# Patient Record
Sex: FEMALE | Race: AMERICAN INDIAN OR ALASKA NATIVE | NOT HISPANIC OR LATINO | Employment: UNEMPLOYED | URBAN - METROPOLITAN AREA
[De-identification: names, ages, dates, MRNs, and addresses within clinical notes are randomized per-mention and may not be internally consistent; named-entity substitution may affect disease eponyms.]

---
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
## 2020-05-25 NOTE — PLAN OF CARE
"Spoke with pt this morning- she states she woke up \"feeling like shit\" because the provider that met with her \"acted like she knew everything about me and what I've been through. She made it seem like I'm not in all of this pain and like I'm a drug seeker.\" Pt states she talked with the provider about going home today. Says she feels like she is safe to go home and would feel more comfortable there then in the hospital- denies SI.     NP states she spoke with pt's mom and she will be picking pt up this afternoon and will call the unit with an ETA- updated pt and pt's nurse.     " HC talked to Pt about wellness program being available again; he would love to continue but has moved to [place].  HC terminated his contract in [place].

## 2020-06-10 ENCOUNTER — PATIENT OUTREACH (OUTPATIENT)
Dept: CARE COORDINATION | Facility: CLINIC | Age: 20
End: 2020-06-10

## 2020-06-10 ENCOUNTER — PRENATAL OFFICE VISIT (OUTPATIENT)
Dept: OBGYN | Facility: OTHER | Age: 20
End: 2020-06-10
Attending: OBSTETRICS & GYNECOLOGY
Payer: COMMERCIAL

## 2020-06-10 VITALS
SYSTOLIC BLOOD PRESSURE: 122 MMHG | HEIGHT: 68 IN | WEIGHT: 225 LBS | BODY MASS INDEX: 34.1 KG/M2 | HEART RATE: 84 BPM | DIASTOLIC BLOOD PRESSURE: 64 MMHG

## 2020-06-10 DIAGNOSIS — Z34.03 ENCOUNTER FOR SUPERVISION OF NORMAL FIRST PREGNANCY IN THIRD TRIMESTER: Primary | ICD-10-CM

## 2020-06-10 PROCEDURE — 90715 TDAP VACCINE 7 YRS/> IM: CPT

## 2020-06-10 PROCEDURE — 99207 ZZC OB VISIT-NO CHARGE - GICH ONLY: CPT | Performed by: OBSTETRICS & GYNECOLOGY

## 2020-06-10 PROCEDURE — 90471 IMMUNIZATION ADMIN: CPT

## 2020-06-10 RX ORDER — PRENATAL VIT/IRON FUM/FOLIC AC 27MG-0.8MG
1 TABLET ORAL DAILY
COMMUNITY
End: 2024-01-17

## 2020-06-10 ASSESSMENT — MIFFLIN-ST. JEOR: SCORE: 1836.15

## 2020-06-10 ASSESSMENT — PAIN SCALES - GENERAL: PAINLEVEL: NO PAIN (0)

## 2020-06-10 NOTE — LETTER
[facility]  [address]  [place] [address]  [phone]          Martha 10, 2020    RE:  Suzy Miles                                                                                                                                                       [address] [address] [facility]   [facility] [address]            To whom it may concern:    Suzy Miles is under my professional care for her pregnancy. Please allow her to have her chihuahua rat terrier dog for emotional and mental health support.       Sincerely,        Eva Ospina MD

## 2020-06-10 NOTE — PROGRESS NOTES
Clinic Care Coordination Contact    Writer met with patient on this date, patient recently transitioned to [facility] for care due to a positive reputation.   Patient residing in [place], looking for housing, meeting with a landlord tomorrow to discuss renting a trailer [place] a [place] court [place] [address] [facility].   Patient excited about this opportunity.   Patient in positive spirits.   Currently working at [employer] in [place], however, due to COVID19 not open at this time. Patient potentially taking up to a year off to raise her baby.   Patient has emotional support animal.   Dr. REFUGIO Ospina wrote order to support animal re to anxiety and depression.   Patient and FOB remain in relationship and living together.   Patient excited to meet her baby girl.  Writer will plan to meet with patient during next OB appt.   Writer will remain involved and supportive to patient as able.     HARI Webb on 6/10/2020 at 3:55 PM

## 2020-06-10 NOTE — NURSING NOTE
Chief Complaint   Patient presents with     Prenatal Care     Transfer of Care from [facility]        Medication Reconciliation: completed   Haven Arreguin LPN  6/10/2020 2:06 PM

## 2020-06-10 NOTE — PROGRESS NOTES
"Transfer OB Visit    S: Patient is here for transfer of care from [facility]. She reports this pregnancy has been uncomplicated. She has occasional BH ctx, no VB or LOF. +FM.     Past Medical History:   Diagnosis Date     Anxiety      Depression      Ovarian cyst      Past Surgical History:   Procedure Laterality Date     NO HISTORY OF SURGERY       Family History   Problem Relation Age of Onset     Ovarian cysts Mother      Diabetes Maternal Grandmother      Social History     Tobacco Use     Smoking status: Former Smoker     Packs/day: 0.25     Last attempt to quit: 11/15/2019     Years since quittin.5     Smokeless tobacco: Never Used   Substance Use Topics     Alcohol use: Not Currently     Drug use: Never     Lives with her boyfriend in a hotel. They are hoping to move into a trailer in [place] this month. Works at a [employer] but is unemployed because of COVID.     O: /64 (BP Location: Right arm, Patient Position: Sitting, Cuff Size: Adult Large)   Pulse 84   Ht 1.715 m (5' 7.5\")   Wt 102.1 kg (225 lb)   Breastfeeding No   BMI 34.72 kg/m    Gen: Well-appearing, NAD  See OB Flowsheet    A/P:  Suzy Miles is a 19 year old  at 34w0d by 24w6d US, here for return OB visit.  Anemia: on iron  Limited prenatal care  Teen pregnancy: SW consult  Plans no epidural, breastfeeding, condoms vs IUD    PNC: - Prenatal labs reviewed in Care Everywhere and from [facility], O positive, Rubella non-immune, GCT 94  Genetics: late for care  Imaging: dating US at 13w1d, anatomy survey at 24w6d- normal except incomplete heart views, normal on follow up  Immunizations: Tdap 6/10/2020   RTC 2 weeks- CBC next visit    Eva Ospina MD  OB/GYN  6/10/2020 2:08 PM     "

## 2020-06-12 ENCOUNTER — PATIENT OUTREACH (OUTPATIENT)
Dept: CARE COORDINATION | Facility: CLINIC | Age: 20
End: 2020-06-12

## 2020-06-12 NOTE — PROGRESS NOTES
Clinic Care Coordination Contact    Writer placed f/u call to patient on this date as patient was looking for housing and has potential option this week.   Patient due with baby in July and looking for permanent housing.   Writer offered to assist patient as able with options.   VM left for patient requesting a return call.     HARI Webb on 6/12/2020 at 10:34 AM

## 2020-06-24 ENCOUNTER — PATIENT OUTREACH (OUTPATIENT)
Dept: CARE COORDINATION | Facility: CLINIC | Age: 20
End: 2020-06-24

## 2020-06-24 ENCOUNTER — PRENATAL OFFICE VISIT (OUTPATIENT)
Dept: OBGYN | Facility: OTHER | Age: 20
End: 2020-06-24
Attending: OBSTETRICS & GYNECOLOGY
Payer: COMMERCIAL

## 2020-06-24 VITALS
DIASTOLIC BLOOD PRESSURE: 72 MMHG | BODY MASS INDEX: 35.65 KG/M2 | SYSTOLIC BLOOD PRESSURE: 118 MMHG | HEART RATE: 88 BPM | WEIGHT: 231 LBS

## 2020-06-24 DIAGNOSIS — Z34.03 ENCOUNTER FOR SUPERVISION OF NORMAL FIRST PREGNANCY IN THIRD TRIMESTER: Primary | ICD-10-CM

## 2020-06-24 DIAGNOSIS — F41.9 ANXIETY: ICD-10-CM

## 2020-06-24 LAB
ERYTHROCYTE [DISTWIDTH] IN BLOOD BY AUTOMATED COUNT: 13.6 % (ref 10–15)
HCT VFR BLD AUTO: 31.3 % (ref 35–47)
HGB BLD-MCNC: 10.4 G/DL (ref 11.7–15.7)
MCH RBC QN AUTO: 30 PG (ref 26.5–33)
MCHC RBC AUTO-ENTMCNC: 33.2 G/DL (ref 31.5–36.5)
MCV RBC AUTO: 90 FL (ref 78–100)
PLATELET # BLD AUTO: 257 10E9/L (ref 150–450)
RBC # BLD AUTO: 3.47 10E12/L (ref 3.8–5.2)
WBC # BLD AUTO: 7.5 10E9/L (ref 4–11)

## 2020-06-24 PROCEDURE — 85027 COMPLETE CBC AUTOMATED: CPT | Mod: ZL | Performed by: OBSTETRICS & GYNECOLOGY

## 2020-06-24 PROCEDURE — 36415 COLL VENOUS BLD VENIPUNCTURE: CPT | Mod: ZL | Performed by: OBSTETRICS & GYNECOLOGY

## 2020-06-24 PROCEDURE — 87081 CULTURE SCREEN ONLY: CPT | Mod: ZL | Performed by: OBSTETRICS & GYNECOLOGY

## 2020-06-24 PROCEDURE — 99207 ZZC OB VISIT-NO CHARGE - GICH ONLY: CPT | Performed by: OBSTETRICS & GYNECOLOGY

## 2020-06-24 RX ORDER — CITALOPRAM HYDROBROMIDE 20 MG/1
20 TABLET ORAL DAILY
Qty: 90 TABLET | Refills: 0 | Status: ON HOLD | OUTPATIENT
Start: 2020-06-24 | End: 2020-08-01

## 2020-06-24 ASSESSMENT — PAIN SCALES - GENERAL: PAINLEVEL: NO PAIN (0)

## 2020-06-24 NOTE — NURSING NOTE
Chief Complaint   Patient presents with     Prenatal Care     36w0d     Offers no complaints except some pressure and pain   Haven Arreguin LPN........................6/24/2020  12:57 PM     Medication Reconciliation: completed   Haven Arreguin LPN  6/24/2020 12:56 PM
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
## 2020-06-24 NOTE — PROGRESS NOTES
Return OB Visit    S: Patient is feeling well. Some ctx at the end of the day. No VB or LOF. +FM. Just moved into her house.     O: /72 (BP Location: Right arm, Patient Position: Sitting, Cuff Size: Adult Large)   Pulse 88   Wt 104.8 kg (231 lb)   Breastfeeding No   BMI 35.65 kg/m    Gen: Well-appearing, NAD  See OB Flowsheet    A/P:  Suzy Miles is a 19 year old  at 36w0d by 24w6d US, here for return OB visit.  Anemia: on iron  Limited prenatal care  Teen pregnancy: SW consult  Plans no epidural, breastfeeding, condoms vs IUD     PNC: - Prenatal labs reviewed in Care Everywhere and from [facility], O positive, Rubella non-immune, GCT 94  Genetics: late for care  Imaging: dating US at 13w1d, anatomy survey at 24w6d- normal except incomplete heart views, normal on follow up  Immunizations: Tdap 6/10/2020   RTC weekly until delivery    Eva Ospina MD  OB/GYN  2020 12:58 PM

## 2020-06-25 ENCOUNTER — PATIENT OUTREACH (OUTPATIENT)
Dept: CARE COORDINATION | Facility: CLINIC | Age: 20
End: 2020-06-25

## 2020-06-25 NOTE — PROGRESS NOTES
Clinic Care Coordination Contact    Writer attempted 2X call out to patient on this date to offer support.   VM left requesting return call.     HARI Webb on 6/25/2020 at 2:45 PM    Clinic Care Coordination Contact    Writer attempted to reach out to patient on this date to f/u and offer support to patient after our conversation yesterday> Phone went to vm, message left requesting return call.   Writer will attempt to reach back out to patient later in the day if I do not hear back.   Writer will remain involved and supportive to patient as able.     HARI Webb on 6/25/2020 at 8:53 AM

## 2020-06-26 LAB
BACTERIA SPEC CULT: NORMAL
SPECIMEN SOURCE: NORMAL

## 2020-07-01 ENCOUNTER — PRENATAL OFFICE VISIT (OUTPATIENT)
Dept: OBGYN | Facility: OTHER | Age: 20
End: 2020-07-01
Attending: OBSTETRICS & GYNECOLOGY
Payer: COMMERCIAL

## 2020-07-01 ENCOUNTER — PATIENT OUTREACH (OUTPATIENT)
Dept: CARE COORDINATION | Facility: CLINIC | Age: 20
End: 2020-07-01

## 2020-07-01 VITALS
SYSTOLIC BLOOD PRESSURE: 136 MMHG | BODY MASS INDEX: 35.8 KG/M2 | DIASTOLIC BLOOD PRESSURE: 64 MMHG | WEIGHT: 232 LBS | HEART RATE: 96 BPM

## 2020-07-01 DIAGNOSIS — Z34.03 ENCOUNTER FOR SUPERVISION OF NORMAL FIRST PREGNANCY IN THIRD TRIMESTER: Primary | ICD-10-CM

## 2020-07-01 PROCEDURE — 99207 ZZC OB VISIT-NO CHARGE - GICH ONLY: CPT | Performed by: OBSTETRICS & GYNECOLOGY

## 2020-07-01 ASSESSMENT — PAIN SCALES - GENERAL: PAINLEVEL: NO PAIN (0)

## 2020-07-01 NOTE — NURSING NOTE
Chief Complaint   Patient presents with     Prenatal Care     37w0d     Patient has complaints of numbness and pain in her hands.  She also has pelvic pain and pressure.   Haven Arreguin LPN........................7/1/2020  1:04 PM     Medication Reconciliation: completed   Haven Arreguin LPN  7/1/2020 1:04 PM
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
## 2020-07-01 NOTE — PROGRESS NOTES
Return OB Visit    S: Patient is feeling well. Just getting uncomfortable. Some BH ctx. No VB or LOF. +FM    O: /64 (BP Location: Right arm, Patient Position: Sitting, Cuff Size: Adult Large)   Pulse 96   Wt 105.2 kg (232 lb)   Breastfeeding No   BMI 35.80 kg/m    Gen: Well-appearing, NAD  See OB Flowsheet    A/P:  Suzy Miles is a 19 year old  at 37w0d by 13w1d US, here for return OB visit.  Anemia: on iron  Limited prenatal care  Teen pregnancy: SW consult  Plans no epidural, breastfeeding, condoms vs IUD     PNC: - Prenatal labs reviewed in Care Everywhere and from [facility], O positive, Rubella non-immune, GCT 94, GBS negative  Genetics: late for care  Imaging: dating US at 13w1d, anatomy survey at 24w6d- normal except incomplete heart views, normal on follow up  Immunizations: Tdap 6/10/2020   RTC weekly until delivery    Eva Ospina MD  OB/GYN  2020 1:07 PM

## 2020-07-09 ENCOUNTER — PRENATAL OFFICE VISIT (OUTPATIENT)
Dept: OBGYN | Facility: OTHER | Age: 20
End: 2020-07-09
Attending: OBSTETRICS & GYNECOLOGY
Payer: COMMERCIAL

## 2020-07-09 VITALS
WEIGHT: 236.4 LBS | SYSTOLIC BLOOD PRESSURE: 122 MMHG | DIASTOLIC BLOOD PRESSURE: 76 MMHG | BODY MASS INDEX: 36.48 KG/M2 | HEART RATE: 98 BPM

## 2020-07-09 DIAGNOSIS — O12.03 EDEMA DURING PREGNANCY IN THIRD TRIMESTER: Primary | ICD-10-CM

## 2020-07-09 LAB
ALBUMIN MFR UR ELPH: 16 MG/DL (ref 1–14)
CREAT UR-MCNC: 97 MG/DL
PROT/CREAT 24H UR: 0.16 MG/G{CREAT}

## 2020-07-09 PROCEDURE — 84156 ASSAY OF PROTEIN URINE: CPT | Mod: ZL | Performed by: OBSTETRICS & GYNECOLOGY

## 2020-07-09 PROCEDURE — 99207 ZZC OB VISIT-NO CHARGE - GICH ONLY: CPT | Performed by: OBSTETRICS & GYNECOLOGY

## 2020-07-09 ASSESSMENT — PAIN SCALES - GENERAL: PAINLEVEL: NO PAIN (0)

## 2020-07-09 NOTE — PROGRESS NOTES
CC: Recheck OB visit at 38w1d    HPI: Suzy Miles presents for a routine OB visit now at 38w1d  She has no concerns. Denies cramping, bleeding, normal fetal movement    OB History    Para Term  AB Living   1 0 0 0 0 0   SAB TAB Ectopic Multiple Live Births   0 0 0 0 0      # Outcome Date GA Lbr Juaquin/2nd Weight Sex Delivery Anes PTL Lv   1 Current              Current Outpatient Medications   Medication     B Complex Vitamins (VITAMIN-B COMPLEX PO)     cholecalciferol (VITAMIN D3) 125 MCG (5000 UT) TABS tablet     citalopram (CELEXA) 20 MG tablet     Ferrous Gluconate (IRON 27 PO)     Prenatal Vit-Fe Fumarate-FA (PRENATAL MULTIVITAMIN W/IRON) 27-0.8 MG tablet     No current facility-administered medications for this visit.          O: /76 (BP Location: Right arm)   Pulse 98   Wt 107.2 kg (236 lb 6.4 oz)   BMI 36.48 kg/m    Body mass index is 36.48 kg/m .  See OB flow sheet  EXAM:  NAD  EFW 7 lbs  FHT:145 bpm    CX FT-1, 25, -2 cephalic    No results found for any visits on 20.    A/P: 38w1d gestation    Recheck in 1 week    Teen pregnancy  Late prenatal care    Reji Carrion MD FACOG  1:02 PM 2020

## 2020-07-15 ENCOUNTER — PRENATAL OFFICE VISIT (OUTPATIENT)
Dept: OBGYN | Facility: OTHER | Age: 20
End: 2020-07-15
Attending: OBSTETRICS & GYNECOLOGY
Payer: COMMERCIAL

## 2020-07-15 ENCOUNTER — PATIENT OUTREACH (OUTPATIENT)
Dept: CARE COORDINATION | Facility: CLINIC | Age: 20
End: 2020-07-15

## 2020-07-15 VITALS
BODY MASS INDEX: 35.95 KG/M2 | DIASTOLIC BLOOD PRESSURE: 60 MMHG | SYSTOLIC BLOOD PRESSURE: 124 MMHG | HEART RATE: 100 BPM | WEIGHT: 233 LBS

## 2020-07-15 DIAGNOSIS — Z34.03 ENCOUNTER FOR SUPERVISION OF NORMAL FIRST PREGNANCY IN THIRD TRIMESTER: Primary | ICD-10-CM

## 2020-07-15 PROCEDURE — 99207 ZZC OB VISIT-NO CHARGE - GICH ONLY: CPT | Performed by: OBSTETRICS & GYNECOLOGY

## 2020-07-15 ASSESSMENT — PAIN SCALES - GENERAL: PAINLEVEL: NO PAIN (0)

## 2020-07-15 NOTE — PROGRESS NOTES
Clinic Care Coordination Contact    Writer met with patient on this date after OB appointment.  Patient currently 39 weeks, dilated to 1, states she is ready to be done with this pregnancy due to discomfort.  Patient aware of the longer the baby stays in the better, but she is uncomfortable and ready to meet her girl.  Patient has appropriate sleeping arrangements for baby, car seat, and plans to meet with [facility] after OB appointment to transition to [facility] where she currently resides.    Writer was able to to her patient to [facility] on this date to see birthing room and meet some the nurses.    Writer will remain involved in supportive to patient as able.    HARI Webb on 7/15/2020 at 1:52 PM

## 2020-07-15 NOTE — PROGRESS NOTES
Return OB Visit    S: Patient is feeling well, just uncomfortable. Some ctx. No VB or LOF. +FM    O: /60 (BP Location: Right arm, Patient Position: Sitting, Cuff Size: Adult Large)   Pulse 100   Wt 105.7 kg (233 lb)   Breastfeeding No   BMI 35.95 kg/m    Gen: Well-appearing, NAD  See OB Flowsheet    A/P:  Suzy Miles is a 19 year old  at 39w0d by 13w1d US, here for return OB visit.  Anemia: on iron  Limited prenatal care  Teen pregnancy: SW consult  Plans no epidural, breastfeeding, condoms vs IUD     PNC: - Prenatal labs reviewed in Care Everywhere and from [facility], O positive, Rubella non-immune, GCT 94, GBS negative  Genetics: late for care  Imaging: dating US at 13w1d, anatomy survey at 24w6d- normal except incomplete heart views, normal on follow up  Immunizations: Tdap 6/10/2020   RTC weekly until delivery    Eva Ospina MD  OB/GYN  7/15/2020 1:09 PM

## 2020-07-15 NOTE — NURSING NOTE
Chief Complaint   Patient presents with     Prenatal Care     39w0d     Increased fatigue and RLQ pelvic pain   Otherwise offers no complaints  Haven Arreguin LPN........................7/15/2020  1:05 PM   Medication Reconciliation: completed   Haven Arreguin LPN  7/15/2020 1:04 PM

## 2020-07-22 ENCOUNTER — PATIENT OUTREACH (OUTPATIENT)
Dept: CARE COORDINATION | Facility: CLINIC | Age: 20
End: 2020-07-22

## 2020-07-22 ENCOUNTER — PRENATAL OFFICE VISIT (OUTPATIENT)
Dept: OBGYN | Facility: OTHER | Age: 20
End: 2020-07-22
Attending: OBSTETRICS & GYNECOLOGY
Payer: COMMERCIAL

## 2020-07-22 VITALS
OXYGEN SATURATION: 98 % | BODY MASS INDEX: 36.32 KG/M2 | DIASTOLIC BLOOD PRESSURE: 64 MMHG | HEART RATE: 94 BPM | SYSTOLIC BLOOD PRESSURE: 116 MMHG | WEIGHT: 235.4 LBS | RESPIRATION RATE: 16 BRPM

## 2020-07-22 DIAGNOSIS — Z34.03 ENCOUNTER FOR SUPERVISION OF NORMAL FIRST PREGNANCY IN THIRD TRIMESTER: Primary | ICD-10-CM

## 2020-07-22 PROCEDURE — 59425 ANTEPARTUM CARE ONLY: CPT | Performed by: OBSTETRICS & GYNECOLOGY

## 2020-07-22 PROCEDURE — 99207 ZZC OB VISIT-NO CHARGE - GICH ONLY: CPT | Performed by: OBSTETRICS & GYNECOLOGY

## 2020-07-22 RX ORDER — LIDOCAINE 40 MG/G
CREAM TOPICAL
Status: CANCELLED | OUTPATIENT
Start: 2020-07-22

## 2020-07-22 RX ORDER — OXYCODONE AND ACETAMINOPHEN 5; 325 MG/1; MG/1
1 TABLET ORAL
Status: CANCELLED | OUTPATIENT
Start: 2020-07-22

## 2020-07-22 RX ORDER — ACETAMINOPHEN 325 MG/1
650 TABLET ORAL EVERY 4 HOURS PRN
Status: CANCELLED | OUTPATIENT
Start: 2020-07-22

## 2020-07-22 RX ORDER — SODIUM CHLORIDE, SODIUM LACTATE, POTASSIUM CHLORIDE, CALCIUM CHLORIDE 600; 310; 30; 20 MG/100ML; MG/100ML; MG/100ML; MG/100ML
INJECTION, SOLUTION INTRAVENOUS CONTINUOUS
Status: CANCELLED | OUTPATIENT
Start: 2020-07-22

## 2020-07-22 RX ORDER — ONDANSETRON 2 MG/ML
4 INJECTION INTRAMUSCULAR; INTRAVENOUS EVERY 6 HOURS PRN
Status: CANCELLED | OUTPATIENT
Start: 2020-07-22

## 2020-07-22 RX ORDER — NALOXONE HYDROCHLORIDE 0.4 MG/ML
.1-.4 INJECTION, SOLUTION INTRAMUSCULAR; INTRAVENOUS; SUBCUTANEOUS
Status: CANCELLED | OUTPATIENT
Start: 2020-07-22

## 2020-07-22 RX ORDER — MISOPROSTOL 100 UG/1
25 TABLET ORAL EVERY 4 HOURS PRN
Status: CANCELLED | OUTPATIENT
Start: 2020-07-22

## 2020-07-22 RX ORDER — TERBUTALINE SULFATE 1 MG/ML
0.25 INJECTION, SOLUTION SUBCUTANEOUS
Status: CANCELLED | OUTPATIENT
Start: 2020-07-22

## 2020-07-22 RX ORDER — METHYLERGONOVINE MALEATE 0.2 MG/ML
200 INJECTION INTRAVENOUS
Status: CANCELLED | OUTPATIENT
Start: 2020-07-22

## 2020-07-22 RX ORDER — IBUPROFEN 200 MG
800 TABLET ORAL
Status: CANCELLED | OUTPATIENT
Start: 2020-07-22

## 2020-07-22 RX ORDER — OXYTOCIN 10 [USP'U]/ML
10 INJECTION, SOLUTION INTRAMUSCULAR; INTRAVENOUS
Status: CANCELLED | OUTPATIENT
Start: 2020-07-22

## 2020-07-22 RX ORDER — CARBOPROST TROMETHAMINE 250 UG/ML
250 INJECTION, SOLUTION INTRAMUSCULAR
Status: CANCELLED | OUTPATIENT
Start: 2020-07-22

## 2020-07-22 ASSESSMENT — PAIN SCALES - GENERAL: PAINLEVEL: NO PAIN (0)

## 2020-07-22 NOTE — PROGRESS NOTES
Return OB Visit    S: Patient is ready to be done. No ctx, VB or LOF. +FM, especially at night.    O: /64 (BP Location: Right arm, Patient Position: Sitting, Cuff Size: Adult Large)   Pulse 94   Resp 16   Wt 106.8 kg (235 lb 6.4 oz)   SpO2 98%   Breastfeeding No   BMI 36.32 kg/m    Gen: Well-appearing, NAD  See OB Flowsheet    A/P:  Suzy Miles is a 19 year old  at 40w0d by 13w1d US, here for return OB visit.  Anemia: on iron  Limited prenatal care  Teen pregnancy: SW consult  Plans no epidural, breastfeeding, condoms vs IUD     PNC: - Prenatal labs reviewed in Care Everywhere and from [facility], O positive, Rubella non-immune, GCT 94, GBS negative  Genetics: late for care  Imaging: dating US at 13w1d, anatomy survey at 24w6d- normal except incomplete heart views, normal on follow up  Immunizations: Tdap 6/10/2020   IOL scheduled for  PM for ripening    Eva Ospina MD  OB/GYN  2020 1:02 PM

## 2020-07-22 NOTE — NURSING NOTE
"Chief Complaint   Patient presents with     Prenatal Care     40 w        Initial /64 (BP Location: Right arm, Patient Position: Sitting, Cuff Size: Adult Large)   Pulse 94   Resp 16   Wt 106.8 kg (235 lb 6.4 oz)   SpO2 98%   Breastfeeding No   BMI 36.32 kg/m   Estimated body mass index is 36.32 kg/m  as calculated from the following:    Height as of 6/10/20: 1.715 m (5' 7.5\").    Weight as of this encounter: 106.8 kg (235 lb 6.4 oz).  Medication Reconciliation: complete    Aurelia Benavides LPN  "

## 2020-07-22 NOTE — PROGRESS NOTES
Clinic Care Coordination Contact      Writer met with patient on this date after OB appt.   Patient overall doing well. Ready to be done. Scheduled induction 07/28/2020 if baby does not come sooner.     Patient has bags packed, car seat in car, gas tank full, basinet set in her room and she has FOB ready for baby's arrival.   Patient plans to seek PCP and health care in [place] after baby is born.    Patient in good spirits, very tired. Takes lots of naps, baby is very low patient states, patient needs to urinate frequently due to position of baby.    Patient talks with writer and excitement to meet her baby girl.  Talks about how she believes her baby's ultrasound picture looks just like FOB.    Writer will remain involved in supportive to patient as able.    Plans to establish care in [place] after birth of baby.      HARI Webb on 7/22/2020 at 1:40 PM

## 2020-07-24 ENCOUNTER — NURSE TRIAGE (OUTPATIENT)
Dept: OBGYN | Facility: OTHER | Age: 20
End: 2020-07-24

## 2020-07-24 ENCOUNTER — HOSPITAL ENCOUNTER (OUTPATIENT)
Facility: OTHER | Age: 20
Discharge: HOME | End: 2020-07-24
Attending: OBSTETRICS & GYNECOLOGY | Admitting: OBSTETRICS & GYNECOLOGY
Payer: COMMERCIAL

## 2020-07-24 VITALS
RESPIRATION RATE: 16 BRPM | TEMPERATURE: 97.4 F | SYSTOLIC BLOOD PRESSURE: 125 MMHG | OXYGEN SATURATION: 100 % | DIASTOLIC BLOOD PRESSURE: 78 MMHG

## 2020-07-24 PROBLEM — Z36.89 ENCOUNTER FOR TRIAGE IN PREGNANT PATIENT: Status: ACTIVE | Noted: 2020-07-24

## 2020-07-24 LAB — A1 MICROGLOB PLACENTAL VAG QL: NEGATIVE

## 2020-07-24 PROCEDURE — 84112 EVAL AMNIOTIC FLUID PROTEIN: CPT | Performed by: OBSTETRICS & GYNECOLOGY

## 2020-07-24 PROCEDURE — G0463 HOSPITAL OUTPT CLINIC VISIT: HCPCS

## 2020-07-24 NOTE — PROGRESS NOTES
Patient is a 19 year old  who presents to [facility] with complaints of rupture of membranes and decreased fetal movement. To room 404. Patient reports feeling a gush of fluid on Wednesday following her OB clinic appointment. Denies any further leaking of fluid. Patient reports that she has felt fetal movement today, but less so than usual. EUM/EFM applied. Category I tracing. Patient marking fetal movement. Uterine irritability noted. Amnisure collected and sent to lab. Cervix 1.5cm dilated upon SVE. Will notify MD of patient arrival and status.

## 2020-07-24 NOTE — TELEPHONE ENCOUNTER
Spoke with patient and she states yesterday she had a gush of fluid and it felt like she was incontinent of urine.  But when she smelt it it did not smell like urine.  She states today baby has been much less active.  Per verbal order from Dr. Eva Ospina MD patient should head into [facility].  Patient is in [place] and will leave now to head in to [facility].   Haven Arreguin LPN........................7/24/2020  3:25 PM

## 2020-07-25 NOTE — PROGRESS NOTES
Discharge Note      Data:  Suzy Miles discharged to home at 1919 via ambulation. Accompanied by significant other and staff.    Action:  Written discharge/follow-up instructions were provided to patient and spouse. Prescriptions - None ordered for discharge. All belongings sent with patient.    Response:  Suzy verbalized understanding of discharge instructions, reason for discharge, and necessary follow-up appointments.    Santiago Cedeño RN 07/24/20 7:33 PM

## 2020-07-25 NOTE — PROGRESS NOTES
MD notified of patient arrival and status. Amnisure negative. Reactive FHT. MD orders to discharge patient to home.

## 2020-07-25 NOTE — DISCHARGE INSTRUCTIONS
Discharge Instructions:     Negative amnisure, cervix unchanged, and category one tracing with irritability of uterus reported to Dr. Becca Ospina.     Diet:  * Drink 8 to 12 glasses of liquids (milk, juice, water) every day  * You may eat meals and snacks.    Activity:  * Count fetal kicks every day.  * Call your doctor if your baby is moving less than usual.    -Helpful hints for kick counts:  Lie down in a quiet room on either side. Place your hand on your abdomen palpating for movement. Babies are usually more active after meals. Babies sleep on an average 20 minutes at a time. At times you may have to wait until your baby wakes up on his own. It is important for your baby to move daily.        Call your provider if you notice:  * Swelling in your face or increased swelling in your hands or legs.  * Headaches that are not relieved by Tylenol (acetaminophen).  * Changes in your vision (blurring; seeing spots or stars).  * Nausea (sick to your stomach) and vomiting (throwing up).  * Weight gain of 5 pounds per week.  * Heartburn that doesn't go away.  * Signs of bladder infection: Pain when you urinate (use the toilet), needing to go more often or more urgently.  * The bag of zurita (membrane) breaks, or you notice leaking in your underwear.  * Bright red blood in your underwear.  * Abdominal (lower belly) or stomach pain.     * For first baby: Contractions (tightenings) less than 5 minutes apart for one hour or more.  * Second (plus) baby: Contractions (tightenings) less than 10 minutes apart and getting stronger.  * Increase or change in vaginal discharge (note the color and amount).    [facility]: [phone]  Please keep your scheduled upcoming clinic appointment    SCHEDULED RIPENING/INDUCTION 7/28 at 1700.

## 2020-07-28 ENCOUNTER — HOSPITAL ENCOUNTER (INPATIENT)
Facility: OTHER | Age: 20
LOS: 4 days | Discharge: HOME | End: 2020-08-01
Attending: OBSTETRICS & GYNECOLOGY | Admitting: OBSTETRICS & GYNECOLOGY
Payer: COMMERCIAL

## 2020-07-28 DIAGNOSIS — Z34.03 ENCOUNTER FOR SUPERVISION OF NORMAL FIRST PREGNANCY IN THIRD TRIMESTER: ICD-10-CM

## 2020-07-28 DIAGNOSIS — Z34.90 ENCOUNTER FOR INDUCTION OF LABOR: Primary | ICD-10-CM

## 2020-07-28 DIAGNOSIS — Z98.891 S/P CESAREAN SECTION: ICD-10-CM

## 2020-07-28 LAB
ABO + RH BLD: NORMAL
ABO + RH BLD: NORMAL
BASOPHILS # BLD AUTO: 0 10E9/L (ref 0–0.2)
BASOPHILS NFR BLD AUTO: 0.3 %
BLD GP AB SCN SERPL QL: NORMAL
BLOOD BANK CMNT PATIENT-IMP: NORMAL
DIFFERENTIAL METHOD BLD: ABNORMAL
EOSINOPHIL # BLD AUTO: 0.1 10E9/L (ref 0–0.7)
EOSINOPHIL NFR BLD AUTO: 0.7 %
ERYTHROCYTE [DISTWIDTH] IN BLOOD BY AUTOMATED COUNT: 14.1 % (ref 10–15)
HCT VFR BLD AUTO: 32.6 % (ref 35–47)
HGB BLD-MCNC: 10.8 G/DL (ref 11.7–15.7)
IMM GRANULOCYTES # BLD: 0.1 10E9/L (ref 0–0.4)
IMM GRANULOCYTES NFR BLD: 1.1 %
LYMPHOCYTES # BLD AUTO: 1.5 10E9/L (ref 0.8–5.3)
LYMPHOCYTES NFR BLD AUTO: 19.5 %
MCH RBC QN AUTO: 29.6 PG (ref 26.5–33)
MCHC RBC AUTO-ENTMCNC: 33.1 G/DL (ref 31.5–36.5)
MCV RBC AUTO: 89 FL (ref 78–100)
MONOCYTES # BLD AUTO: 0.5 10E9/L (ref 0–1.3)
MONOCYTES NFR BLD AUTO: 6.6 %
NEUTROPHILS # BLD AUTO: 5.4 10E9/L (ref 1.6–8.3)
NEUTROPHILS NFR BLD AUTO: 71.8 %
PLATELET # BLD AUTO: 255 10E9/L (ref 150–450)
RBC # BLD AUTO: 3.65 10E12/L (ref 3.8–5.2)
SPECIMEN EXP DATE BLD: NORMAL
WBC # BLD AUTO: 7.5 10E9/L (ref 4–11)

## 2020-07-28 PROCEDURE — 86780 TREPONEMA PALLIDUM: CPT | Performed by: OBSTETRICS & GYNECOLOGY

## 2020-07-28 PROCEDURE — 25000128 H RX IP 250 OP 636: Performed by: OBSTETRICS & GYNECOLOGY

## 2020-07-28 PROCEDURE — 86850 RBC ANTIBODY SCREEN: CPT | Performed by: OBSTETRICS & GYNECOLOGY

## 2020-07-28 PROCEDURE — 85025 COMPLETE CBC W/AUTO DIFF WBC: CPT | Performed by: OBSTETRICS & GYNECOLOGY

## 2020-07-28 PROCEDURE — 86901 BLOOD TYPING SEROLOGIC RH(D): CPT | Performed by: OBSTETRICS & GYNECOLOGY

## 2020-07-28 PROCEDURE — 86900 BLOOD TYPING SEROLOGIC ABO: CPT | Performed by: OBSTETRICS & GYNECOLOGY

## 2020-07-28 PROCEDURE — 3E0P7VZ INTRODUCTION OF HORMONE INTO FEMALE REPRODUCTIVE, VIA NATURAL OR ARTIFICIAL OPENING: ICD-10-PCS | Performed by: OBSTETRICS & GYNECOLOGY

## 2020-07-28 PROCEDURE — 12000000 ZZH R&B MED SURG/OB

## 2020-07-28 PROCEDURE — G0463 HOSPITAL OUTPT CLINIC VISIT: HCPCS

## 2020-07-28 PROCEDURE — 36415 COLL VENOUS BLD VENIPUNCTURE: CPT | Performed by: OBSTETRICS & GYNECOLOGY

## 2020-07-28 PROCEDURE — 25000132 ZZH RX MED GY IP 250 OP 250 PS 637: Performed by: OBSTETRICS & GYNECOLOGY

## 2020-07-28 RX ORDER — CARBOPROST TROMETHAMINE 250 UG/ML
250 INJECTION, SOLUTION INTRAMUSCULAR
Status: DISCONTINUED | OUTPATIENT
Start: 2020-07-28 | End: 2020-07-30

## 2020-07-28 RX ORDER — METHYLERGONOVINE MALEATE 0.2 MG/ML
200 INJECTION INTRAVENOUS
Status: COMPLETED | OUTPATIENT
Start: 2020-07-28 | End: 2020-07-30

## 2020-07-28 RX ORDER — OXYCODONE AND ACETAMINOPHEN 5; 325 MG/1; MG/1
1 TABLET ORAL
Status: DISCONTINUED | OUTPATIENT
Start: 2020-07-28 | End: 2020-07-30

## 2020-07-28 RX ORDER — MISOPROSTOL 100 UG/1
25 TABLET ORAL EVERY 4 HOURS PRN
Status: DISCONTINUED | OUTPATIENT
Start: 2020-07-28 | End: 2020-07-30

## 2020-07-28 RX ORDER — HYDROXYZINE PAMOATE 25 MG/1
100 CAPSULE ORAL
Status: DISCONTINUED | OUTPATIENT
Start: 2020-07-28 | End: 2020-07-30

## 2020-07-28 RX ORDER — NALOXONE HYDROCHLORIDE 0.4 MG/ML
.1-.4 INJECTION, SOLUTION INTRAMUSCULAR; INTRAVENOUS; SUBCUTANEOUS
Status: DISCONTINUED | OUTPATIENT
Start: 2020-07-28 | End: 2020-07-30

## 2020-07-28 RX ORDER — SODIUM CHLORIDE, SODIUM LACTATE, POTASSIUM CHLORIDE, CALCIUM CHLORIDE 600; 310; 30; 20 MG/100ML; MG/100ML; MG/100ML; MG/100ML
INJECTION, SOLUTION INTRAVENOUS CONTINUOUS
Status: DISCONTINUED | OUTPATIENT
Start: 2020-07-28 | End: 2020-07-30

## 2020-07-28 RX ORDER — ACETAMINOPHEN 325 MG/1
650 TABLET ORAL EVERY 4 HOURS PRN
Status: DISCONTINUED | OUTPATIENT
Start: 2020-07-28 | End: 2020-07-30

## 2020-07-28 RX ORDER — LIDOCAINE 40 MG/G
CREAM TOPICAL
Status: DISCONTINUED | OUTPATIENT
Start: 2020-07-28 | End: 2020-07-30

## 2020-07-28 RX ORDER — OXYTOCIN 10 [USP'U]/ML
10 INJECTION, SOLUTION INTRAMUSCULAR; INTRAVENOUS
Status: DISCONTINUED | OUTPATIENT
Start: 2020-07-28 | End: 2020-07-30

## 2020-07-28 RX ORDER — IBUPROFEN 400 MG/1
800 TABLET, FILM COATED ORAL
Status: DISCONTINUED | OUTPATIENT
Start: 2020-07-28 | End: 2020-07-30

## 2020-07-28 RX ORDER — ONDANSETRON 2 MG/ML
4 INJECTION INTRAMUSCULAR; INTRAVENOUS EVERY 6 HOURS PRN
Status: DISCONTINUED | OUTPATIENT
Start: 2020-07-28 | End: 2020-07-30

## 2020-07-28 RX ORDER — MORPHINE SULFATE 10 MG/ML
10 INJECTION, SOLUTION INTRAMUSCULAR; INTRAVENOUS
Status: COMPLETED | OUTPATIENT
Start: 2020-07-28 | End: 2020-07-28

## 2020-07-28 RX ORDER — TERBUTALINE SULFATE 1 MG/ML
0.25 INJECTION, SOLUTION SUBCUTANEOUS
Status: DISCONTINUED | OUTPATIENT
Start: 2020-07-28 | End: 2020-07-30

## 2020-07-28 RX ADMIN — MORPHINE SULFATE 10 MG: 10 INJECTION INTRAVENOUS at 22:16

## 2020-07-28 RX ADMIN — MISOPROSTOL 25 MCG: 100 TABLET ORAL at 18:01

## 2020-07-28 RX ADMIN — MISOPROSTOL 25 MCG: 100 TABLET ORAL at 22:16

## 2020-07-28 RX ADMIN — HYDROXYZINE PAMOATE 100 MG: 25 CAPSULE ORAL at 22:16

## 2020-07-28 ASSESSMENT — ACTIVITIES OF DAILY LIVING (ADL)
RETIRED_COMMUNICATION: 0-->UNDERSTANDS/COMMUNICATES WITHOUT DIFFICULTY
AMBULATION: 0-->INDEPENDENT
TOILETING: 0-->INDEPENDENT
COGNITION: 0 - NO COGNITION ISSUES REPORTED
RETIRED_EATING: 0-->INDEPENDENT
DRESS: 0-->INDEPENDENT
SWALLOWING: 0-->SWALLOWS FOODS/LIQUIDS WITHOUT DIFFICULTY
TRANSFERRING: 0-->INDEPENDENT
BATHING: 0-->INDEPENDENT
FALL_HISTORY_WITHIN_LAST_SIX_MONTHS: NO

## 2020-07-28 ASSESSMENT — MIFFLIN-ST. JEOR: SCORE: 1881.51

## 2020-07-28 NOTE — H&P
[facility] Labor and Delivery History and Physical    Suzy Miles MRN# [id number]   Age: 19 year old YOB: 2000     Date of Admission:  2020    Primary care provider: No Ref-Primary, Physician           Chief Complaint:   Suzy Miles is a 19 year old  at 40w6d by 13w1d US admitted for IOL for late-term gestation. Some ctx, no regular pattern. No VB or LOF. +FM          Pregnancy history:     OBSTETRIC HISTORY:    OB History    Para Term  AB Living   1 0 0 0 0 0   SAB TAB Ectopic Multiple Live Births   0 0 0 0 0      # Outcome Date GA Lbr Juaquin/2nd Weight Sex Delivery Anes PTL Lv   1 Current                EDC: Estimated Date of Delivery: 2020    Prenatal Labs:   Lab Results   Component Value Date    HGB 10.4 (L) 2020       GBS Status: negative    Active Problem List  Patient Active Problem List   Diagnosis     Major depression     Encounter for triage in pregnant patient       Medication Prior to Admission  Medications Prior to Admission   Medication Sig Dispense Refill Last Dose     B Complex Vitamins (VITAMIN-B COMPLEX PO) Take 1 tablet by mouth daily        cholecalciferol (VITAMIN D3) 125 MCG (5000 UT) TABS tablet Take by mouth daily        citalopram (CELEXA) 20 MG tablet Take 1 tablet (20 mg) by mouth daily 90 tablet 0      Ferrous Gluconate (IRON 27 PO) Take 1 tablet by mouth daily        Prenatal Vit-Fe Fumarate-FA (PRENATAL MULTIVITAMIN W/IRON) 27-0.8 MG tablet Take 1 tablet by mouth daily      .        Maternal Past Medical History:     Past Medical History:   Diagnosis Date     Anxiety      Depression      Ovarian cyst      Past Surgical History:   Procedure Laterality Date     NO HISTORY OF SURGERY                         Family History:     Family History   Problem Relation Age of Onset     Ovarian cysts Mother      Diabetes Maternal Grandmother      [facility] history [facility]            Social History:      Social History     Tobacco Use     Smoking status: Former Smoker     Packs/day: 0.25     Last attempt to quit: 11/15/2019     Years since quittin.7     Smokeless tobacco: Never Used   Substance Use Topics     Alcohol use: Not Currently            Review of Systems:   The Review of Systems is negative other than noted in the HPI          Physical Exam:   No data found.  Gen: resting in bed, NAD  CV: RRR  Resp: CTAB  Abd: gravid, soft, nontender  Ext: nontender    Cervix: 1.5/50/-3  Membranes: intact  EFW: 7.5-8 lbs  Presentation:Cephalic    Fetal Heart Rate Tracin, mod variability, + accels, no decels  Tocometer: infrequent        Assessment:   Suzy Miles is a 19 year old  at 40w6d admitted for IOL for late-term gestation          Plan:   FWB: Cat I, reactive. EFW 7.5-8 lbs  Labor: will start with cervical ripening with misoprostol  Pain: per patient request  Rh positive, RI, GBS negative  Anticipate     Eva Ospina MD

## 2020-07-28 NOTE — LETTER
[facility]  [address]  [place] [address]  [phone]          2020    RE:  Suzy Miles                                                                                                                                                       [address] [place] [id number]            To whom it may concern:    Suzy Miles is under my professional care for her pregnancy. She delivered via  section on 2020. Please allow her off from work until  to recover from her delivery.       Sincerely,    Eva Ospina MD

## 2020-07-28 NOTE — PROGRESS NOTES
19 year old female admitted to room 406 for scheduled cervical ripening at 40.6 weeks gestation. Oriented to room, answered questions and reviewed plan of care with patient and shelia Aguirre. M applied. RAIMUNDO PISANO here to see patient and sterile vaginal exam completed. Cervix 1.5cm, 50% effaced and vertex fetal positioning.

## 2020-07-28 NOTE — LETTER
[facility]  [address]  [place] [address]  [phone]          2020    RE:  Suzy Miles                                                                                                                                                       [address] [place] [id number]            To whom it may concern:    Suzy Miles delivered via  section on 2020. Please excuse her boyfriend, Timothy, from work for 2 weeks to assist her during her recovery.    Sincerely,      Eva Ospina MD

## 2020-07-29 PROCEDURE — 25000128 H RX IP 250 OP 636: Performed by: OBSTETRICS & GYNECOLOGY

## 2020-07-29 PROCEDURE — 25800030 ZZH RX IP 258 OP 636: Performed by: OBSTETRICS & GYNECOLOGY

## 2020-07-29 PROCEDURE — 10H07YZ INSERTION OF OTHER DEVICE INTO PRODUCTS OF CONCEPTION, VIA NATURAL OR ARTIFICIAL OPENING: ICD-10-PCS | Performed by: OBSTETRICS & GYNECOLOGY

## 2020-07-29 PROCEDURE — 12000000 ZZH R&B MED SURG/OB

## 2020-07-29 PROCEDURE — 25000125 ZZHC RX 250: Performed by: OBSTETRICS & GYNECOLOGY

## 2020-07-29 PROCEDURE — 10907ZC DRAINAGE OF AMNIOTIC FLUID, THERAPEUTIC FROM PRODUCTS OF CONCEPTION, VIA NATURAL OR ARTIFICIAL OPENING: ICD-10-PCS | Performed by: OBSTETRICS & GYNECOLOGY

## 2020-07-29 PROCEDURE — 25000132 ZZH RX MED GY IP 250 OP 250 PS 637: Performed by: OBSTETRICS & GYNECOLOGY

## 2020-07-29 RX ORDER — FENTANYL CITRATE 50 UG/ML
100 INJECTION, SOLUTION INTRAMUSCULAR; INTRAVENOUS
Status: DISCONTINUED | OUTPATIENT
Start: 2020-07-29 | End: 2020-07-30

## 2020-07-29 RX ADMIN — FENTANYL CITRATE 100 MCG: 50 INJECTION, SOLUTION INTRAMUSCULAR; INTRAVENOUS at 12:19

## 2020-07-29 RX ADMIN — SODIUM CHLORIDE, POTASSIUM CHLORIDE, SODIUM LACTATE AND CALCIUM CHLORIDE: 600; 310; 30; 20 INJECTION, SOLUTION INTRAVENOUS at 18:01

## 2020-07-29 RX ADMIN — Medication 2 MILLI-UNITS/MIN: at 06:31

## 2020-07-29 RX ADMIN — MISOPROSTOL 25 MCG: 100 TABLET ORAL at 02:32

## 2020-07-29 RX ADMIN — FENTANYL CITRATE 100 MCG: 50 INJECTION, SOLUTION INTRAMUSCULAR; INTRAVENOUS at 14:20

## 2020-07-29 RX ADMIN — SODIUM CHLORIDE, POTASSIUM CHLORIDE, SODIUM LACTATE AND CALCIUM CHLORIDE 1000 ML: 600; 310; 30; 20 INJECTION, SOLUTION INTRAVENOUS at 14:16

## 2020-07-29 RX ADMIN — SODIUM CHLORIDE, POTASSIUM CHLORIDE, SODIUM LACTATE AND CALCIUM CHLORIDE: 600; 310; 30; 20 INJECTION, SOLUTION INTRAVENOUS at 06:30

## 2020-07-29 RX ADMIN — FENTANYL CITRATE 100 MCG: 50 INJECTION, SOLUTION INTRAMUSCULAR; INTRAVENOUS at 10:55

## 2020-07-29 NOTE — PROGRESS NOTES
"Intrapartum Progress Note    S: Patient is breathing through contractions. Had a gush earlier but no continued leaking.     O: /65   Pulse 70   Temp 97.2  F (36.2  C) (Temporal)   Resp 18   Ht 1.715 m (5' 7.5\")   Wt 106.6 kg (235 lb)   SpO2 100%   Breastfeeding No   BMI 36.26 kg/m     Gen: resting in bed, NAD  Cvx: 2-3/80/-2    FHT: 150, mod variability, + accels, no decels  [place]: 5 contractions in 10 min    Membranes: AROM- clear    A/P: 19 year old  at 41w0d by 13w1d US admitted for IOL for late-term gestation  FWB: Cat I, reactive. EFW 7.5-8 lbs  Labor: s/p misoprostol overnight, on pitocin per protocol  Pain: per patient request  GBS: negative, PCN not indicated  Rh: positive  Rubella: immune  Continue routine labor management.   Anticipate     Eva Ospina MD 10:10 AM    "

## 2020-07-29 NOTE — PROGRESS NOTES
"Intrapartum Progress Note    S: Patient is tired, was cramping most of the night and couldn't sleep. Contractions are uncomfortable.      O: /65   Pulse 94   Temp 97.2  F (36.2  C)   Resp 18   Ht 1.715 m (5' 7.5\")   Wt 106.6 kg (235 lb)   SpO2 100%   Breastfeeding No   BMI 36.26 kg/m     Gen: on birthing ball  Cvx: 1.5 cm at RN exam at 0600    FHT: 140, mod variability, + accels, no decels  [place]: 4-5 contractions in 10 min    Membranes: intact    A/P: 19 year old  at 41w0d by 13w1d US admitted for IOL for late-term gestation  FWB: Cat I, reactive. EFW 7.5-8 lbs  Labor: s/p misoprostol overnight, on pitocin per protocol  Pain: per patient request  GBS: negative, PCN not indicated  Rh: positive  Rubella: immune  Continue routine labor management.   Anticipate     Eva Ospina MD 8:07 AM    "

## 2020-07-29 NOTE — PLAN OF CARE
This writer attempted two IV''s, two more attempts by another RN, new IV placed by HECTOR Henderson.  Restarted fluids and Pitocin at 2.

## 2020-07-29 NOTE — PLAN OF CARE
Stopped Pitocin.  Second 500 mL bolus given.  New bag of LR to hang at 125/hr and IV infiltrated.  Will establish new IV, fluids and pitocin restart at that time.

## 2020-07-29 NOTE — PLAN OF CARE
Labor Shift Note  Data: See Flowsheets activity for contraction and fetal documentation.   Vitals:    20 2358 20 0001 20 0021 20 0724   BP:  129/84 117/71 115/65   Pulse:       Resp:       Temp: 96.6  F (35.9  C)   97.2  F (36.2  C)   TempSrc:       SpO2: 100%   100%   Weight:       Height:       . Signs and symptoms of infection Absent.    Complications in labor: Absent. Support person sleeping on cot at bedside present.  Interventions: Continue uterine/fetal assessment per orders and nursing discretion. Vital Signs per order set. Comfort measures/pain control/labor management: Ambulation, hydration, position changes, birthing ball/sling and tub options to facilitate labor.  Anticipate   Plan: Anticipate . Provide labor/coping assistance as needed by patient and support person.  Observe for and notify care provider of indications of progressing labor, need for pain medications, or signs of fetal/maternal compromise.

## 2020-07-29 NOTE — PLAN OF CARE
Labor Shift Note  Data: See Flowsheets activity for contraction and fetal documentation.   Vitals:    20 0001 20 0021 20 0724 20 1545   BP: 129/84 117/71 115/65 138/80   Pulse:   70 66   Resp:       Temp:   97.2  F (36.2  C) 97.4  F (36.3  C)   TempSrc:   Temporal Temporal   SpO2:   100% 99%   Weight:       Height:       . Signs and symptoms of infection Absent.    Complications in labor: Absent. Support person Timothy present.  Interventions: Continue uterine/fetal assessment per orders and nursing discretion. Vital Signs per order set. Comfort measures/pain control/labor management: Ambulation, hydration, position changes, birthing ball/sling and tub options to facilitate labor.  Anticipate .  Patient has had three doses of IV Fentanyl 100mcg, tolerated well and states that helps her cope with labor pain.  Does not want epidural at this time.    MD on Dr. Ospina will check in on patient before 8:30PM today-consulted and agrees with plan.  Plan: Anticipate . Provide labor/coping assistance as needed by patient and support person.  Observe for and notify care provider of indications of progressing labor, need for pain medications, or signs of fetal/maternal compromise.

## 2020-07-29 NOTE — PROGRESS NOTES
"Intrapartum Progress Note    S: Patient is very uncomfortable.     O: /65   Pulse 70   Temp 97.2  F (36.2  C) (Temporal)   Resp 18   Ht 1.715 m (5' 7.5\")   Wt 106.6 kg (235 lb)   SpO2 100%   Breastfeeding No   BMI 36.26 kg/m     Gen: resting in bed, NAD  Cvx: 3/80/-2    FHT: 140, mod variability, + accels, no decels  [place]: irritability, IUPC placed    Membranes: s/p AROM, clear    A/P: 19 year old  at 41w0d by 13w1d US admitted for IOL for late-term gestation  FWB: Cat I, reactive. EFW 7.5-8 lbs  Labor: s/p misoprostol overnight, on pitocin per protocol, IUPC placed. No progress since last exam- previous exam difficult due to patient discomfort and suspect was not actually 4-5 cm.  Pain: per patient request  GBS: negative, PCN not indicated  Rh: positive  Rubella: immune  Continue routine labor management.   Anticipate     Eva Ospina MD 2:43 PM    "

## 2020-07-29 NOTE — PROGRESS NOTES
Morphine 10mg vial overridden and handed off to Liliya YOUNG.   Head atraumatic, normal cephalic shape.

## 2020-07-29 NOTE — PROGRESS NOTES
"Intrapartum Progress Note    S: Patient is very uncomfortable.     O: /65   Pulse 70   Temp 97.2  F (36.2  C) (Temporal)   Resp 18   Ht 1.715 m (5' 7.5\")   Wt 106.6 kg (235 lb)   SpO2 100%   Breastfeeding No   BMI 36.26 kg/m     Gen: resting in bed, NAD  Cvx: 4-5/80/-2    FHT: 140, mod variability, + accels, no decels  [place]: 5 contractions in 10 min    Membranes: s/p AROM, clear    A/P: 19 year old  at 41w0d by 13w1d US admitted for IOL for late-term gestation  FWB: Cat I, reactive. EFW 7.5-8 lbs  Labor: s/p misoprostol overnight, on pitocin per protocol  Pain: per patient request  GBS: negative, PCN not indicated  Rh: positive  Rubella: immune  Continue routine labor management.   Anticipate     Eva Ospina MD 12:37 PM    "

## 2020-07-29 NOTE — PROGRESS NOTES
Patient has been resting comfortably in between doses of cytotec, given at 0225. SVE at 0225: 1.5cm/60-70%/-2. Patient reports passing her mucous plug when getting up to the bathroom at 0200.

## 2020-07-29 NOTE — PROGRESS NOTES
Patient given second dose of cytotec at  2225. SVE unchanged from admit. Morphine and Vistaril administered at 2216 to assist patient with sleep.

## 2020-07-30 ENCOUNTER — ANESTHESIA EVENT (OUTPATIENT)
Dept: SURGERY | Facility: OTHER | Age: 20
End: 2020-07-30
Payer: COMMERCIAL

## 2020-07-30 ENCOUNTER — ANESTHESIA (OUTPATIENT)
Dept: SURGERY | Facility: OTHER | Age: 20
End: 2020-07-30
Payer: COMMERCIAL

## 2020-07-30 PROBLEM — Z98.891 S/P CESAREAN SECTION: Status: ACTIVE | Noted: 2020-07-30

## 2020-07-30 LAB
LABORATORY COMMENT REPORT: NORMAL
SARS-COV-2 RNA SPEC QL NAA+PROBE: NEGATIVE
SARS-COV-2 RNA SPEC QL NAA+PROBE: NORMAL
SPECIMEN SOURCE: NORMAL
SPECIMEN SOURCE: NORMAL
T PALLIDUM AB SER QL: NONREACTIVE

## 2020-07-30 PROCEDURE — 25000128 H RX IP 250 OP 636: Performed by: NURSE ANESTHETIST, CERTIFIED REGISTERED

## 2020-07-30 PROCEDURE — 59510 CESAREAN DELIVERY: CPT | Performed by: NURSE ANESTHETIST, CERTIFIED REGISTERED

## 2020-07-30 PROCEDURE — 27210794 ZZH OR GENERAL SUPPLY STERILE: Performed by: OBSTETRICS & GYNECOLOGY

## 2020-07-30 PROCEDURE — 25800030 ZZH RX IP 258 OP 636: Performed by: NURSE ANESTHETIST, CERTIFIED REGISTERED

## 2020-07-30 PROCEDURE — 37000008 ZZH ANESTHESIA TECHNICAL FEE, 1ST 30 MIN: Performed by: OBSTETRICS & GYNECOLOGY

## 2020-07-30 PROCEDURE — 25000132 ZZH RX MED GY IP 250 OP 250 PS 637: Performed by: OBSTETRICS & GYNECOLOGY

## 2020-07-30 PROCEDURE — 25000128 H RX IP 250 OP 636: Performed by: OBSTETRICS & GYNECOLOGY

## 2020-07-30 PROCEDURE — 37000009 ZZH ANESTHESIA TECHNICAL FEE, EACH ADDTL 15 MIN: Performed by: OBSTETRICS & GYNECOLOGY

## 2020-07-30 PROCEDURE — 36000056 ZZH SURGERY LEVEL 3 1ST 30 MIN: Performed by: OBSTETRICS & GYNECOLOGY

## 2020-07-30 PROCEDURE — 25800030 ZZH RX IP 258 OP 636: Performed by: OBSTETRICS & GYNECOLOGY

## 2020-07-30 PROCEDURE — 99140 ANES COMP EMERGENCY COND: CPT | Performed by: NURSE ANESTHETIST, CERTIFIED REGISTERED

## 2020-07-30 PROCEDURE — 71000014 ZZH RECOVERY PHASE 1 LEVEL 2 FIRST HR: Performed by: OBSTETRICS & GYNECOLOGY

## 2020-07-30 PROCEDURE — 59515 CESAREAN DELIVERY: CPT | Performed by: OBSTETRICS & GYNECOLOGY

## 2020-07-30 PROCEDURE — 25000125 ZZHC RX 250: Performed by: OBSTETRICS & GYNECOLOGY

## 2020-07-30 PROCEDURE — 36000058 ZZH SURGERY LEVEL 3 EA 15 ADDTL MIN: Performed by: OBSTETRICS & GYNECOLOGY

## 2020-07-30 PROCEDURE — 25000125 ZZHC RX 250: Performed by: NURSE ANESTHETIST, CERTIFIED REGISTERED

## 2020-07-30 PROCEDURE — 87635 SARS-COV-2 COVID-19 AMP PRB: CPT | Performed by: OBSTETRICS & GYNECOLOGY

## 2020-07-30 PROCEDURE — 12000000 ZZH R&B MED SURG/OB

## 2020-07-30 RX ORDER — BUPIVACAINE HYDROCHLORIDE 7.5 MG/ML
INJECTION, SOLUTION INTRASPINAL PRN
Status: DISCONTINUED | OUTPATIENT
Start: 2020-07-30 | End: 2020-07-30

## 2020-07-30 RX ORDER — FENTANYL CITRATE 50 UG/ML
25-50 INJECTION, SOLUTION INTRAMUSCULAR; INTRAVENOUS
Status: DISCONTINUED | OUTPATIENT
Start: 2020-07-30 | End: 2020-07-30 | Stop reason: HOSPADM

## 2020-07-30 RX ORDER — MORPHINE SULFATE 0.5 MG/ML
INJECTION, SOLUTION EPIDURAL; INTRATHECAL; INTRAVENOUS PRN
Status: DISCONTINUED | OUTPATIENT
Start: 2020-07-30 | End: 2020-07-30

## 2020-07-30 RX ORDER — KETOROLAC TROMETHAMINE 30 MG/ML
30 INJECTION, SOLUTION INTRAMUSCULAR; INTRAVENOUS EVERY 6 HOURS
Status: COMPLETED | OUTPATIENT
Start: 2020-07-30 | End: 2020-07-31

## 2020-07-30 RX ORDER — AMOXICILLIN 250 MG
2 CAPSULE ORAL 2 TIMES DAILY
Status: DISCONTINUED | OUTPATIENT
Start: 2020-07-30 | End: 2020-08-01 | Stop reason: HOSPADM

## 2020-07-30 RX ORDER — CEFAZOLIN SODIUM 1 G/50ML
1 INJECTION, SOLUTION INTRAVENOUS SEE ADMIN INSTRUCTIONS
Status: DISCONTINUED | OUTPATIENT
Start: 2020-07-30 | End: 2020-07-30 | Stop reason: HOSPADM

## 2020-07-30 RX ORDER — IBUPROFEN 400 MG/1
800 TABLET, FILM COATED ORAL EVERY 6 HOURS
Status: DISCONTINUED | OUTPATIENT
Start: 2020-07-31 | End: 2020-08-01 | Stop reason: HOSPADM

## 2020-07-30 RX ORDER — OXYCODONE HYDROCHLORIDE 5 MG/1
5-10 TABLET ORAL EVERY 4 HOURS PRN
Status: DISCONTINUED | OUTPATIENT
Start: 2020-07-30 | End: 2020-08-01 | Stop reason: HOSPADM

## 2020-07-30 RX ORDER — HYDROCORTISONE 2.5 %
CREAM (GRAM) TOPICAL 3 TIMES DAILY PRN
Status: DISCONTINUED | OUTPATIENT
Start: 2020-07-30 | End: 2020-08-01 | Stop reason: HOSPADM

## 2020-07-30 RX ORDER — PROPOFOL 10 MG/ML
INJECTION, EMULSION INTRAVENOUS PRN
Status: DISCONTINUED | OUTPATIENT
Start: 2020-07-30 | End: 2020-07-30

## 2020-07-30 RX ORDER — OXYCODONE HYDROCHLORIDE 5 MG/1
5 TABLET ORAL EVERY 4 HOURS PRN
Status: DISCONTINUED | OUTPATIENT
Start: 2020-07-30 | End: 2020-07-30 | Stop reason: DRUGHIGH

## 2020-07-30 RX ORDER — OXYCODONE HYDROCHLORIDE 5 MG/1
10 TABLET ORAL EVERY 4 HOURS PRN
Status: DISCONTINUED | OUTPATIENT
Start: 2020-07-30 | End: 2020-07-30 | Stop reason: DRUGHIGH

## 2020-07-30 RX ORDER — HYDROMORPHONE HYDROCHLORIDE 1 MG/ML
0.5 INJECTION, SOLUTION INTRAMUSCULAR; INTRAVENOUS; SUBCUTANEOUS EVERY 4 HOURS PRN
Status: DISCONTINUED | OUTPATIENT
Start: 2020-07-30 | End: 2020-08-01 | Stop reason: HOSPADM

## 2020-07-30 RX ORDER — HYDROMORPHONE HYDROCHLORIDE 1 MG/ML
.3-.5 INJECTION, SOLUTION INTRAMUSCULAR; INTRAVENOUS; SUBCUTANEOUS EVERY 5 MIN PRN
Status: DISCONTINUED | OUTPATIENT
Start: 2020-07-30 | End: 2020-07-30 | Stop reason: HOSPADM

## 2020-07-30 RX ORDER — ONDANSETRON 2 MG/ML
4 INJECTION INTRAMUSCULAR; INTRAVENOUS EVERY 30 MIN PRN
Status: DISCONTINUED | OUTPATIENT
Start: 2020-07-30 | End: 2020-07-30 | Stop reason: HOSPADM

## 2020-07-30 RX ORDER — NALOXONE HYDROCHLORIDE 0.4 MG/ML
.1-.4 INJECTION, SOLUTION INTRAMUSCULAR; INTRAVENOUS; SUBCUTANEOUS
Status: DISCONTINUED | OUTPATIENT
Start: 2020-07-30 | End: 2020-07-30

## 2020-07-30 RX ORDER — ONDANSETRON 2 MG/ML
INJECTION INTRAMUSCULAR; INTRAVENOUS PRN
Status: DISCONTINUED | OUTPATIENT
Start: 2020-07-30 | End: 2020-07-30

## 2020-07-30 RX ORDER — AMOXICILLIN 250 MG
1 CAPSULE ORAL 2 TIMES DAILY
Status: DISCONTINUED | OUTPATIENT
Start: 2020-07-30 | End: 2020-08-01 | Stop reason: HOSPADM

## 2020-07-30 RX ORDER — CEFAZOLIN SODIUM 2 G/100ML
INJECTION, SOLUTION INTRAVENOUS PRN
Status: DISCONTINUED | OUTPATIENT
Start: 2020-07-30 | End: 2020-07-30

## 2020-07-30 RX ORDER — OXYTOCIN 10 [USP'U]/ML
INJECTION, SOLUTION INTRAMUSCULAR; INTRAVENOUS PRN
Status: DISCONTINUED | OUTPATIENT
Start: 2020-07-30 | End: 2020-07-30

## 2020-07-30 RX ORDER — CITRIC ACID/SODIUM CITRATE 334-500MG
30 SOLUTION, ORAL ORAL
Status: DISCONTINUED | OUTPATIENT
Start: 2020-07-30 | End: 2020-07-30 | Stop reason: HOSPADM

## 2020-07-30 RX ORDER — ONDANSETRON 2 MG/ML
4 INJECTION INTRAMUSCULAR; INTRAVENOUS EVERY 6 HOURS PRN
Status: DISCONTINUED | OUTPATIENT
Start: 2020-07-30 | End: 2020-08-01 | Stop reason: HOSPADM

## 2020-07-30 RX ORDER — OXYTOCIN 10 [USP'U]/ML
10 INJECTION, SOLUTION INTRAMUSCULAR; INTRAVENOUS
Status: DISCONTINUED | OUTPATIENT
Start: 2020-07-30 | End: 2020-08-01 | Stop reason: HOSPADM

## 2020-07-30 RX ORDER — MAGNESIUM HYDROXIDE 1200 MG/15ML
LIQUID ORAL PRN
Status: DISCONTINUED | OUTPATIENT
Start: 2020-07-30 | End: 2020-07-30 | Stop reason: HOSPADM

## 2020-07-30 RX ORDER — SODIUM CHLORIDE, SODIUM LACTATE, POTASSIUM CHLORIDE, CALCIUM CHLORIDE 600; 310; 30; 20 MG/100ML; MG/100ML; MG/100ML; MG/100ML
INJECTION, SOLUTION INTRAVENOUS CONTINUOUS
Status: DISCONTINUED | OUTPATIENT
Start: 2020-07-30 | End: 2020-08-01 | Stop reason: HOSPADM

## 2020-07-30 RX ORDER — LIDOCAINE HYDROCHLORIDE 10 MG/ML
INJECTION, SOLUTION INFILTRATION; PERINEURAL PRN
Status: DISCONTINUED | OUTPATIENT
Start: 2020-07-30 | End: 2020-07-30

## 2020-07-30 RX ORDER — NALOXONE HYDROCHLORIDE 0.4 MG/ML
.1-.4 INJECTION, SOLUTION INTRAMUSCULAR; INTRAVENOUS; SUBCUTANEOUS
Status: ACTIVE | OUTPATIENT
Start: 2020-07-30 | End: 2020-07-31

## 2020-07-30 RX ORDER — SIMETHICONE 80 MG
80 TABLET,CHEWABLE ORAL 4 TIMES DAILY PRN
Status: DISCONTINUED | OUTPATIENT
Start: 2020-07-30 | End: 2020-08-01 | Stop reason: HOSPADM

## 2020-07-30 RX ORDER — DEXTROSE, SODIUM CHLORIDE, SODIUM LACTATE, POTASSIUM CHLORIDE, AND CALCIUM CHLORIDE 5; .6; .31; .03; .02 G/100ML; G/100ML; G/100ML; G/100ML; G/100ML
INJECTION, SOLUTION INTRAVENOUS CONTINUOUS
Status: DISCONTINUED | OUTPATIENT
Start: 2020-07-30 | End: 2020-08-01 | Stop reason: HOSPADM

## 2020-07-30 RX ORDER — SODIUM CHLORIDE, SODIUM LACTATE, POTASSIUM CHLORIDE, CALCIUM CHLORIDE 600; 310; 30; 20 MG/100ML; MG/100ML; MG/100ML; MG/100ML
INJECTION, SOLUTION INTRAVENOUS CONTINUOUS
Status: DISCONTINUED | OUTPATIENT
Start: 2020-07-30 | End: 2020-07-30 | Stop reason: HOSPADM

## 2020-07-30 RX ORDER — BISACODYL 10 MG
10 SUPPOSITORY, RECTAL RECTAL DAILY PRN
Status: DISCONTINUED | OUTPATIENT
Start: 2020-08-01 | End: 2020-08-01 | Stop reason: HOSPADM

## 2020-07-30 RX ORDER — ACETAMINOPHEN 325 MG/1
975 TABLET ORAL EVERY 6 HOURS
Status: DISCONTINUED | OUTPATIENT
Start: 2020-07-30 | End: 2020-08-01 | Stop reason: HOSPADM

## 2020-07-30 RX ORDER — MODIFIED LANOLIN
OINTMENT (GRAM) TOPICAL
Status: DISCONTINUED | OUTPATIENT
Start: 2020-07-30 | End: 2020-08-01 | Stop reason: HOSPADM

## 2020-07-30 RX ORDER — AZITHROMYCIN 500 MG/5ML
500 INJECTION, POWDER, LYOPHILIZED, FOR SOLUTION INTRAVENOUS
Status: DISCONTINUED | OUTPATIENT
Start: 2020-07-30 | End: 2020-07-30 | Stop reason: HOSPADM

## 2020-07-30 RX ORDER — KETOROLAC TROMETHAMINE 30 MG/ML
30 INJECTION, SOLUTION INTRAMUSCULAR; INTRAVENOUS EVERY 6 HOURS
Status: DISCONTINUED | OUTPATIENT
Start: 2020-07-30 | End: 2020-07-30

## 2020-07-30 RX ORDER — CEFAZOLIN SODIUM 2 G/100ML
2 INJECTION, SOLUTION INTRAVENOUS
Status: DISCONTINUED | OUTPATIENT
Start: 2020-07-30 | End: 2020-07-30 | Stop reason: HOSPADM

## 2020-07-30 RX ORDER — LIDOCAINE 40 MG/G
CREAM TOPICAL
Status: DISCONTINUED | OUTPATIENT
Start: 2020-07-30 | End: 2020-08-01 | Stop reason: HOSPADM

## 2020-07-30 RX ORDER — ONDANSETRON 4 MG/1
4 TABLET, ORALLY DISINTEGRATING ORAL EVERY 30 MIN PRN
Status: DISCONTINUED | OUTPATIENT
Start: 2020-07-30 | End: 2020-07-30 | Stop reason: HOSPADM

## 2020-07-30 RX ORDER — HYDROXYZINE PAMOATE 25 MG/1
50 CAPSULE ORAL ONCE
Status: COMPLETED | OUTPATIENT
Start: 2020-07-30 | End: 2020-07-30

## 2020-07-30 RX ADMIN — SODIUM CHLORIDE, POTASSIUM CHLORIDE, SODIUM LACTATE AND CALCIUM CHLORIDE: 600; 310; 30; 20 INJECTION, SOLUTION INTRAVENOUS at 00:57

## 2020-07-30 RX ADMIN — OXYCODONE HYDROCHLORIDE 10 MG: 5 TABLET ORAL at 11:50

## 2020-07-30 RX ADMIN — OXYTOCIN 3 UNITS: 10 INJECTION, SOLUTION INTRAMUSCULAR; INTRAVENOUS at 07:17

## 2020-07-30 RX ADMIN — Medication 100 ML/HR: at 11:50

## 2020-07-30 RX ADMIN — KETOROLAC TROMETHAMINE 30 MG: 30 INJECTION, SOLUTION INTRAMUSCULAR at 15:32

## 2020-07-30 RX ADMIN — BUPIVACAINE HYDROCHLORIDE IN DEXTROSE 2 ML: 7.5 INJECTION, SOLUTION SUBARACHNOID at 06:57

## 2020-07-30 RX ADMIN — KETOROLAC TROMETHAMINE 30 MG: 30 INJECTION, SOLUTION INTRAMUSCULAR at 21:12

## 2020-07-30 RX ADMIN — PROPOFOL 50 MG: 10 INJECTION, EMULSION INTRAVENOUS at 07:30

## 2020-07-30 RX ADMIN — HYDROXYZINE PAMOATE 50 MG: 25 CAPSULE ORAL at 09:35

## 2020-07-30 RX ADMIN — ACETAMINOPHEN 975 MG: 325 TABLET, FILM COATED ORAL at 18:23

## 2020-07-30 RX ADMIN — FENTANYL CITRATE 100 MCG: 50 INJECTION, SOLUTION INTRAMUSCULAR; INTRAVENOUS at 04:45

## 2020-07-30 RX ADMIN — LIDOCAINE HYDROCHLORIDE 5 ML: 10 INJECTION, SOLUTION INFILTRATION; PERINEURAL at 06:56

## 2020-07-30 RX ADMIN — PROPOFOL 20 MG: 10 INJECTION, EMULSION INTRAVENOUS at 07:40

## 2020-07-30 RX ADMIN — MORPHINE SULFATE 0.2 MG: 0.5 INJECTION, SOLUTION EPIDURAL; INTRATHECAL; INTRAVENOUS at 06:57

## 2020-07-30 RX ADMIN — PROPOFOL 20 MG: 10 INJECTION, EMULSION INTRAVENOUS at 07:34

## 2020-07-30 RX ADMIN — PROPOFOL 30 MG: 10 INJECTION, EMULSION INTRAVENOUS at 07:37

## 2020-07-30 RX ADMIN — FENTANYL CITRATE 100 MCG: 50 INJECTION, SOLUTION INTRAMUSCULAR; INTRAVENOUS at 00:18

## 2020-07-30 RX ADMIN — AZITHROMYCIN MONOHYDRATE 500 MG: 500 INJECTION, POWDER, LYOPHILIZED, FOR SOLUTION INTRAVENOUS at 07:07

## 2020-07-30 RX ADMIN — SODIUM CHLORIDE, POTASSIUM CHLORIDE, SODIUM LACTATE AND CALCIUM CHLORIDE 1000 ML: 600; 310; 30; 20 INJECTION, SOLUTION INTRAVENOUS at 04:52

## 2020-07-30 RX ADMIN — METHYLERGONOVINE MALEATE 200 MCG: 0.2 INJECTION INTRAMUSCULAR; INTRAVENOUS at 07:30

## 2020-07-30 RX ADMIN — CEFAZOLIN SODIUM 2 G: 2 INJECTION, SOLUTION INTRAVENOUS at 07:00

## 2020-07-30 RX ADMIN — OXYTOCIN 3 UNITS: 10 INJECTION, SOLUTION INTRAMUSCULAR; INTRAVENOUS at 07:20

## 2020-07-30 RX ADMIN — PROPOFOL 30 MG: 10 INJECTION, EMULSION INTRAVENOUS at 07:32

## 2020-07-30 RX ADMIN — OXYTOCIN 3 UNITS: 10 INJECTION, SOLUTION INTRAMUSCULAR; INTRAVENOUS at 07:23

## 2020-07-30 RX ADMIN — DOCUSATE SODIUM 50MG AND SENNOSIDES 8.6MG 1 TABLET: 8.6; 5 TABLET, FILM COATED ORAL at 21:12

## 2020-07-30 RX ADMIN — ACETAMINOPHEN 975 MG: 325 TABLET, FILM COATED ORAL at 11:50

## 2020-07-30 RX ADMIN — SODIUM CHLORIDE, POTASSIUM CHLORIDE, SODIUM LACTATE AND CALCIUM CHLORIDE: 600; 310; 30; 20 INJECTION, SOLUTION INTRAVENOUS at 08:29

## 2020-07-30 RX ADMIN — PHENYLEPHRINE HYDROCHLORIDE 0.1 MCG/KG/MIN: 10 INJECTION INTRAVENOUS at 07:00

## 2020-07-30 RX ADMIN — FENTANYL CITRATE 100 MCG: 50 INJECTION, SOLUTION INTRAMUSCULAR; INTRAVENOUS at 02:28

## 2020-07-30 RX ADMIN — MIDAZOLAM 2 MG: 1 INJECTION INTRAMUSCULAR; INTRAVENOUS at 09:58

## 2020-07-30 RX ADMIN — FENTANYL CITRATE 100 MCG: 50 INJECTION, SOLUTION INTRAMUSCULAR; INTRAVENOUS at 03:25

## 2020-07-30 RX ADMIN — ONDANSETRON 4 MG: 2 INJECTION INTRAMUSCULAR; INTRAVENOUS at 07:19

## 2020-07-30 RX ADMIN — Medication: at 07:18

## 2020-07-30 ASSESSMENT — LIFESTYLE VARIABLES: TOBACCO_USE: 1

## 2020-07-30 NOTE — ANESTHESIA POSTPROCEDURE EVALUATION
Patient: Suzy Miles    Procedure(s):   SECTION    Diagnosis:Encounter for induction of labor [Z34.90]  Diagnosis Additional Information: No value filed.    Anesthesia Type:  Spinal    Note:  Anesthesia Post Evaluation    Patient location during evaluation: OB PACU  Patient participation: Able to fully participate in evaluation  Level of consciousness: awake and alert  Pain management: adequate  Airway patency: patent  Cardiovascular status: acceptable  Respiratory status: acceptable  Hydration status: acceptable  PONV: none     Anesthetic complications: None          Last vitals:  Vitals:    20 0830 20 0832 20 0835   BP:  (!) 86/61 120/60   Pulse:  80 76   Resp:  20 (!) 51   Temp:  97.2  F (36.2  C) 97  F (36.1  C)   SpO2: 98%  100%         Electronically Signed By: AZEB Clement CRNA  2020  10:42 AM

## 2020-07-30 NOTE — PROGRESS NOTES
MD here at 0530 to assess patient. Patient became very painful and was requesting more pain relief, evaluating if patient has made progress and could have an epidural at this time. SVE at 0530: no change from 0230 per MD.  Section recommended and agreed to by patient at 0545. House supervisor notified and OR crew called in. Pitocin stopped and patient prepped for surgery. Timothy, significant other, present and planning to go with patient to OR. Consent forms signed along with consent to take home placenta. Report given to OR staff.

## 2020-07-30 NOTE — ANESTHESIA PROCEDURE NOTES
Procedure note : intrathecal  Staff -       CRNA: Donn Valdivia APRN CRNA    Performed By: CRNA        Pre-Procedure    Location: OR    Procedure Times:2020 6:53 AM and 2020 6:57 AM  Pre-Anesthestic Checklist: patient identified, IV checked, site marked, risks and benefits discussed, informed consent, monitors and equipment checked, pre-op evaluation, at physician/surgeon's request and post-op pain management    Timeout  Correct Patient: Yes   Correct Procedure: Yes   Correct Site: Yes   Correct Laterality: N/A   Correct Position: Yes   Site Marked: N/A   .   Procedure Documentation  ASA 2 and Emergent  Diagnosis:.    Procedure: intrathecal, .   Patient Position:sitting Insertion Site:L3-4  (midline approach)     Patient Prep/Sterile Barriers; mask, sterile gloves, chlorhexidine gluconate and isopropyl alcohol.  .  Needle:  Spinal Needle (gauge): 25  Spinal/LP Needle Length (inches): 5 # of attempts: # of redirects:  2 Introducer used .        Assessment/Narrative  Paresthesias: No.  .  .  clear CSF fluid removed . Time Injected: 06:57  Comments:  Unable to reach SA space with 3.5 inch needle. Used a long 256 gauge luanne needle to reach SA space with FFCSF. NO POI.

## 2020-07-30 NOTE — PROGRESS NOTES
"Intrapartum Progress Note    S: Patient is uncomfortable.     O: /80   Pulse 66   Temp 97.4  F (36.3  C) (Temporal)   Resp 18   Ht 1.715 m (5' 7.5\")   Wt 106.6 kg (235 lb)   SpO2 99%   Breastfeeding No   BMI 36.26 kg/m     Gen: breathing through contractions  Cvx: 4/95/-2    FHT: 155, mod variability, + accels, no decels  [place]: 175 MVUs    Membranes: clear    A/P: 19 year old  at 41w0d by 13w1d US admitted for IOL for late-term gestation  FWB: Cat I, reactive. EFW 7.5-8 lbs  Labor: s/p misoprostol overnight, on pitocin per protocol, IUPC in place. Making slow progress.   Pain: per patient request  GBS: negative, PCN not indicated  Rh: positive  Rubella: immune  Continue routine labor management.   Anticipate     Eva Ospina MD 8:30 PM    "

## 2020-07-30 NOTE — ANESTHESIA CARE TRANSFER NOTE
Patient: Suzy Miles    * No procedures listed *    Diagnosis: * No pre-op diagnosis entered *  Diagnosis Additional Information: No value filed.    Anesthesia Type:   Spinal     Note:  Airway :Room Air  Patient transferred to:PACU  Handoff Report: Identifed the Patient, Identified the Reponsible Provider, Reviewed the pertinent medical history, Discussed the surgical course, Reviewed Intra-OP anesthesia mangement and issues during anesthesia, Set expectations for post-procedure period and Allowed opportunity for questions and acknowledgement of understanding      Vitals: (Last set prior to Anesthesia Care Transfer)    CRNA VITALS  7/30/2020 0722 - 7/30/2020 0759      7/30/2020             Resp Rate (set):  10                Electronically Signed By: AZEB Clement CRNA  July 30, 2020  7:59 AM

## 2020-07-30 NOTE — ANESTHESIA PREPROCEDURE EVALUATION
Anesthesia Pre-Procedure Evaluation    Patient: Suzy Miles   MRN: [id number] : 2000          Preoperative Diagnosis: * No pre-op diagnosis entered *    * No procedures listed *    Past Medical History:   Diagnosis Date     Anxiety      Depression      Ovarian cyst      Past Surgical History:   Procedure Laterality Date     NO HISTORY OF SURGERY         Anesthesia Evaluation       history and physical reviewed . Pt has not had prior anesthetic            ROS/MED HX    ENT/Pulmonary:     (+)tobacco use, Past use , . .    Neurologic:  - neg neurologic ROS     Cardiovascular:  - neg cardiovascular ROS       METS/Exercise Tolerance:  >4 METS   Hematologic:         Musculoskeletal:  - neg musculoskeletal ROS       GI/Hepatic:     (+) GERD       Renal/Genitourinary:  - ROS Renal section negative       Endo:     (+) Obesity, .      Psychiatric:     (+) psychiatric history depression and anxiety      Infectious Disease:  - neg infectious disease ROS       Malignancy:      - no malignancy   Other:    (+) Possibly pregnant                    neg OB ROS            Physical Exam  Normal systems: cardiovascular, pulmonary and dental    Airway   Mallampati: I  TM distance: > 3 FB  Neck ROM: full  Mouth opening: > 3 cm    Dental     Cardiovascular   Rhythm and rate: regular and normal      Pulmonary    breath sounds clear to auscultation            Lab Results   Component Value Date    WBC 7.5 2020    HGB 10.8 (L) 2020    HCT 32.6 (L) 2020     2020       Preop Vitals  BP Readings from Last 3 Encounters:   20 117/74   20 125/78   20 116/64    Pulse Readings from Last 3 Encounters:   20 66   20 94   07/15/20 100      Resp Readings from Last 3 Encounters:   20 16   20 16   20 16    SpO2 Readings from Last 3 Encounters:   20 99%   20 100%   20 98%      Temp Readings from Last 1 Encounters:   20 98.1  F (36.7  C)    Ht  "Readings from Last 1 Encounters:   07/28/20 1.715 m (5' 7.5\") (90 %, Z= 1.26)*     * Growth percentiles are based on CDC (Girls, 2-20 Years) data.      Wt Readings from Last 1 Encounters:   07/28/20 106.6 kg (235 lb) (>99 %, Z= 2.38)*     * Growth percentiles are based on CDC (Girls, 2-20 Years) data.    Estimated body mass index is 36.26 kg/m  as calculated from the following:    Height as of this encounter: 1.715 m (5' 7.5\").    Weight as of this encounter: 106.6 kg (235 lb).       Anesthesia Plan      History & Physical Review      ASA Status:  2 emergent.  OB Epidural Asa: 2   NPO Status:  > 8 hours    Plan for Spinal (ITN for PPC)            Postoperative Care      Consents  Anesthetic plan, risks, benefits and alternatives discussed with:  Patient.  Use of blood products discussed: Yes.   Use of blood products discussed with Patient.  Consented to blood products.  .                 AZEB Clement CRNA  "

## 2020-07-30 NOTE — PROGRESS NOTES
Report from Alysia WILSON RN. Patient resting in bed, IV infusing, alert and oriented, taking clear liquids and tolerating well. Abd dressing is D/I, fundus is firm at U,  mod amt of lochia notes on noel pad. [place] is draining a large amount of clear yellow urine, SCD's in place. Offers no complaints at this time. Will continue to monitor.

## 2020-07-30 NOTE — PROGRESS NOTES
"Intrapartum Progress Note    S: Patient is still very uncomfortable.     O: /74   Pulse 66   Temp 98.1  F (36.7  C)   Resp 16   Ht 1.715 m (5' 7.5\")   Wt 106.6 kg (235 lb)   SpO2 99%   Breastfeeding No   BMI 36.26 kg/m     Gen: resting in bed, breathing through contractions, tearful  Cvx: 5/90/-2    FHT: 155, mod variability, + accels, no decels  [place]: adequate by MVUs majority of night    Membranes: clear    A/P: 19 year old  at 41w1d by 13w1d US admitted for IOL for late-term gestation  FWB: Cat I, reactive. EFW 7.5-8 lbs  Labor: s/p misoprostol overnight, on pitocin for almost 24 hours. Reviewed very slow labor course despite adequate contractions. Offered  section vs waiting a few more hours since last cervical change was at 0200. Patient desires to proceed with  section. Discussed risks and benefits in detail, including risk of bleeding, infection, injury to surrounding organs. Consent signed.     GBS: negative, PCN not indicated  Rh: positive  Rubella: immune  Proceed to  section    Eva Ospina MD 5:56 AM    "

## 2020-07-30 NOTE — OP NOTE
[facility]  Obstetrics Service Operative Report    Surgery Date:  2020  Surgeon(s): Eva Ospina MD   Assistants: Laine Saha MD    Preoperative Diagnoses:  1.  Intrauterine pregnancy at 41w1d  2.  Arrest of dilation    Postoperative diagnoses: Same     Procedure performed:  Primary low segment transverse  section via Pfannenstiel incision with double layer uterine closure    Anesthesia:  Spinal with duramorph  Est Blood Loss (mL): 800 mL  Specimens: cord blood  Complications: None      Operative findings: Single viable female infant at 0716 hours on 2020. Apgars and weight pending. Fetal presentation: cephalic.  Position: CELIA, asynclitic  Amniotic fluid: clear.  Placenta intact with 3 vessel cord.   Normal appearing uterus, fallopian tubes, ovaries.  No intraabdominal adhesions.  No abdominal wall adhesions      Indication: Suzy Miles is a 19 year old, , who was admitted at 40w6d by 13w1d ultrasound for induction of labor for late-term gestation.  She received 12 hours of cervical ripening followed by nearly 24 hours of oxytocin. She progressed very slowly to 5cm and then made no further progress despite adequate contractions by MVUs.  The risks, benefits, and alternatives of  delivery were explained and the patient agreed to proceed.     Procedure details:  After obtaining informed consent, the patient was taken to the operating room. She received 2g Ancef and azithromycin prior to the skin incision. She was placed in the dorsal supine position with a leftward tilt and prepped and draped in the usual sterile fashion. Following test of adequate spinal anesthesia, the abdomen was entered through a transverse skin incision. The skin incision was made sharply and carried through the subcutaneous tissue to the fascia.  Fascia was incised in the midline and extended laterally with the Andrew scissors.  The superior margin of the fascial incision was grasped with Kocher  clamps and dissected from the underlying muscle sharp and blunt dissecton, which was then repeated at the lower margin of the fascial incision.  The muscle was  in the midline.  The peritoneum was entered bluntly and the opening extended by digital dissection with care to avoid the bladder.  An Capo O retractor was placed. The lower segment of the uterus was opened sharply in a transverse fashion and extended with digital pressure. The infant's head was elevated to the level of the hysterotomy and was delivered atraumatically. The cord was doubly clamped and cut and the infant was handed off Dr Saha.  The placenta was expressed.  The uterus was exteriorized from the abdomen and cleared of all clots and debris.  The uterus was massaged and was noted to be boggy.  Oxytocin was given through the running IV.  With massage as well as administration of oxytocin, the uterus remained boggy. IM methergine was then given with improvement in uterine tone.  The hysterotomy was repaired with 0-vicryl suture in a running locked fashion. A 2nd layer of 0-monocryl was used to imbricate the incision and good hemostasis was achieved. The bladder flap was inspected and found to be hemostatic.      The posterior cul-de-sac was suctioned and the uterus was returned to the abdomen.  The bilateral pericolic gutters were suctioned.  The hysterotomy was again inspected and found to have no active sites of bleeding.  The abdominal wall was examined and found to have no active sites of bleeding.  The fascia was closed with a running suture of 0-vicryl.  Subcutaneous tissue was irrigated. Areas that were oozing were controlled with cautery. The subcutaneous tissue was reapproximated with 3-0 plain gut. The skin was closed with 4-0 vicryl. The patient tolerated the procedure well and was taken to the recovery room in stable condition. All sponge, needle and instrument counts were correct x2.     Assistant: Dr Saha was requested to  be present for this  section due to unscheduled  section, prolonged labor, and late-term gestation.    Eva Ospina  Ob/Gyn   2020 7:51 AM

## 2020-07-31 ENCOUNTER — PATIENT OUTREACH (OUTPATIENT)
Dept: CARE COORDINATION | Facility: CLINIC | Age: 20
End: 2020-07-31

## 2020-07-31 LAB — HGB BLD-MCNC: 8.1 G/DL (ref 11.7–15.7)

## 2020-07-31 PROCEDURE — 85018 HEMOGLOBIN: CPT | Performed by: OBSTETRICS & GYNECOLOGY

## 2020-07-31 PROCEDURE — 25000128 H RX IP 250 OP 636: Performed by: OBSTETRICS & GYNECOLOGY

## 2020-07-31 PROCEDURE — 90707 MMR VACCINE SC: CPT | Performed by: OBSTETRICS & GYNECOLOGY

## 2020-07-31 PROCEDURE — 12000000 ZZH R&B MED SURG/OB

## 2020-07-31 PROCEDURE — 36415 COLL VENOUS BLD VENIPUNCTURE: CPT | Performed by: OBSTETRICS & GYNECOLOGY

## 2020-07-31 PROCEDURE — 25000132 ZZH RX MED GY IP 250 OP 250 PS 637: Performed by: OBSTETRICS & GYNECOLOGY

## 2020-07-31 RX ADMIN — OXYCODONE HYDROCHLORIDE 5 MG: 5 TABLET ORAL at 05:56

## 2020-07-31 RX ADMIN — KETOROLAC TROMETHAMINE 30 MG: 30 INJECTION, SOLUTION INTRAMUSCULAR at 02:48

## 2020-07-31 RX ADMIN — ACETAMINOPHEN 975 MG: 325 TABLET, FILM COATED ORAL at 05:56

## 2020-07-31 RX ADMIN — SIMETHICONE 80 MG: 80 TABLET, CHEWABLE ORAL at 21:15

## 2020-07-31 RX ADMIN — DOCUSATE SODIUM 50MG AND SENNOSIDES 8.6MG 1 TABLET: 8.6; 5 TABLET, FILM COATED ORAL at 07:57

## 2020-07-31 RX ADMIN — ACETAMINOPHEN 975 MG: 325 TABLET, FILM COATED ORAL at 11:17

## 2020-07-31 RX ADMIN — ACETAMINOPHEN 975 MG: 325 TABLET, FILM COATED ORAL at 18:01

## 2020-07-31 RX ADMIN — DOCUSATE SODIUM 50MG AND SENNOSIDES 8.6MG 1 TABLET: 8.6; 5 TABLET, FILM COATED ORAL at 21:15

## 2020-07-31 RX ADMIN — IBUPROFEN 800 MG: 400 TABLET, FILM COATED ORAL at 07:57

## 2020-07-31 RX ADMIN — IBUPROFEN 800 MG: 400 TABLET, FILM COATED ORAL at 21:15

## 2020-07-31 RX ADMIN — IBUPROFEN 800 MG: 400 TABLET, FILM COATED ORAL at 14:01

## 2020-07-31 RX ADMIN — OXYCODONE HYDROCHLORIDE 5 MG: 5 TABLET ORAL at 00:54

## 2020-07-31 RX ADMIN — ACETAMINOPHEN 975 MG: 325 TABLET, FILM COATED ORAL at 00:54

## 2020-07-31 RX ADMIN — MEASLES, MUMPS, AND RUBELLA VIRUS VACCINE LIVE 0.5 ML: 1000; 12500; 1000 INJECTION, POWDER, LYOPHILIZED, FOR SUSPENSION SUBCUTANEOUS at 11:18

## 2020-07-31 NOTE — PLAN OF CARE
VSS. Breastfeeding independently. Pt independent in room. Did shower this shift. Incision is open to air, steri strips intact. Macias was removed and voided 600 mL. Pain controlled with tylenol and ibuprofen.

## 2020-07-31 NOTE — PROGRESS NOTES
OB/GYN Postpartum Note      S:  Patient is feeling well this morning, just very tired.  Macias removed this AM, hasn't voided yet. Pain control has been good. Tolerating regular diet. Ambulated yesterday, no dizziness.     O:   Vitals:    20 1418 20 1535 20 0200 20 0744   BP: 93/50 104/51 115/62 99/67   BP Location:       Pulse: 74  62    Resp: 16 16 16 16   Temp: 98.1  F (36.7  C) 98.9  F (37.2  C) 98.3  F (36.8  C) 98.5  F (36.9  C)   TempSrc: Oral Tympanic Oral Tympanic   SpO2:  95%  98%   Weight:       Height:         General: resting in bed, in NAD  Resp: nonlabored  Abdomen: soft, nontender, nondistended  Fundus firm at umbilicus+1  Incision: covered in bandage  Extremities: nontender    Hemoglobin   Date Value Ref Range Status   2020 8.1 (L) 11.7 - 15.7 g/dL Final   ]    A: Ms. Suzy Miles is a 20 year old  POD #1 s/p PLTCS for AODilation    P:  Heme 10.8 >  > 8.1, asymptomatic acute blood loss anemia. will need iron at discharge  GI: tolerating regular diet  Feeding: breast  Contraception: condoms vs IUD  Rubella non-immune  Rh positive  Disposition: routine PP cares, anticipate discharge in 1-2 days    Eva Ospina MD  OB/GYN  2020 8:30 AM

## 2020-07-31 NOTE — LACTATION NOTE
This note was copied from a baby's chart.  INPATIENT LACTATION CONSULT      Consult with Suzy and dimple regarding breastfeeding.  Obvious rooting with a strong latch observed this feeding session.  Rhythmic and aggressive suckling also noted.  Instructed Suzy on correct positioning and technique when latching babe on.  Suzy is independent with latching babe onto breast.  Minimal assistance required.  Encouraged Suzy on the importance of frequent feedings throughout the day (at least 8-12 feedings in a 24 hour period) and skin to skin contact.  Suzy demonstrated and states she understands all information given.    Allie Disla RN, IBCLC  Lactation Consultant  [facility]

## 2020-07-31 NOTE — PROGRESS NOTES
Clinic Care Coordination Contact    Writer met with patient in [facility] on this date. Baby girl arrived yesterday with C Section.   When writer entered room, patient nursing baby, FOB in bed sleeping on cot.   Patient in good spirits during visit. She states ideally would like to leave and go home.   Educated patient recommendation of 3 day stay to monitor patient after surgical procedure.   Writer updated MD as well.   Patient's goal today is to shower.   Patient birthday today. 20 years old.   Patient and writer have no concerns upon discharge.     Writer been working with patient for over a month and visiting regularly.   Patients plan is to establish care in [place] where she currently resides.   Patient has large support system.     Writer will remain involved and supportive to patient and family as able.

## 2020-08-01 VITALS
BODY MASS INDEX: 35.61 KG/M2 | WEIGHT: 235 LBS | DIASTOLIC BLOOD PRESSURE: 81 MMHG | OXYGEN SATURATION: 96 % | HEART RATE: 82 BPM | HEIGHT: 68 IN | SYSTOLIC BLOOD PRESSURE: 119 MMHG | TEMPERATURE: 97.7 F | RESPIRATION RATE: 16 BRPM

## 2020-08-01 PROCEDURE — 25000132 ZZH RX MED GY IP 250 OP 250 PS 637: Performed by: OBSTETRICS & GYNECOLOGY

## 2020-08-01 PROCEDURE — 99207 ZZC NO CHARGE LOS: CPT | Performed by: OBSTETRICS & GYNECOLOGY

## 2020-08-01 RX ORDER — OXYCODONE HYDROCHLORIDE 5 MG/1
5 TABLET ORAL EVERY 6 HOURS PRN
Qty: 12 TABLET | Refills: 0 | Status: SHIPPED | OUTPATIENT
Start: 2020-08-01 | End: 2020-09-11

## 2020-08-01 RX ORDER — QUINIDINE GLUCONATE 324 MG
1 TABLET, EXTENDED RELEASE ORAL DAILY
Qty: 30 TABLET | Refills: 0 | Status: SHIPPED | OUTPATIENT
Start: 2020-08-01 | End: 2024-01-17

## 2020-08-01 RX ORDER — IBUPROFEN 600 MG/1
600 TABLET, FILM COATED ORAL EVERY 6 HOURS
Qty: 30 TABLET | Refills: 0 | Status: SHIPPED | OUTPATIENT
Start: 2020-08-01 | End: 2024-01-17

## 2020-08-01 RX ORDER — AMOXICILLIN 250 MG
1 CAPSULE ORAL 2 TIMES DAILY PRN
Qty: 30 TABLET | Refills: 0 | Status: SHIPPED | OUTPATIENT
Start: 2020-08-01 | End: 2024-01-17

## 2020-08-01 RX ADMIN — ACETAMINOPHEN 975 MG: 325 TABLET, FILM COATED ORAL at 00:45

## 2020-08-01 RX ADMIN — IBUPROFEN 800 MG: 400 TABLET, FILM COATED ORAL at 03:10

## 2020-08-01 RX ADMIN — IBUPROFEN 800 MG: 400 TABLET, FILM COATED ORAL at 08:51

## 2020-08-01 RX ADMIN — DOCUSATE SODIUM 50MG AND SENNOSIDES 8.6MG 1 TABLET: 8.6; 5 TABLET, FILM COATED ORAL at 08:51

## 2020-08-01 RX ADMIN — ACETAMINOPHEN 975 MG: 325 TABLET, FILM COATED ORAL at 06:30

## 2020-08-01 NOTE — PLAN OF CARE
Data: Vital signs within normal limits. Postpartum checks within normal limits - see flow record. Patient eating and drinking normally. Patient able to empty bladder independently and is up ambulating. No apparent signs of infection. Incision healing well. Patient performing self cares and is able to care for infant.  Action: Patient medicated during the shift for pain. See MAR. Patient reassessed within 1 hour after each medication and pain was improved - patient stated she was comfortable. Patient education done about breastfeeding, soothing fussy baby. See flow record.  Response: Positive attachment behaviors observed with infant. Support person present.   Plan: Anticipate discharge on 8/1

## 2020-08-01 NOTE — PLAN OF CARE
POD 2 following cercarian section. Incision CDI. Pain tolerable with ibuprofen. Vitals stable. Very attentive to baby's needs. Bonding well. Education provided regarding infant and personal post-partum care. Demonstrated and verbalized understanding of education provided. Verbalizes readiness to discharge home today.

## 2020-08-01 NOTE — PROGRESS NOTES
[facility]    Obstetrics Post-Op / Progress Note    Assessment & Plan   Assessment:  -2 Days Post-Op  Procedure(s):   SECTION    Doing well.  Clean wound without signs of infection.  Normal healing wound.  No immediate surgical complications identified.  No excessive bleeding  Pain well-controlled.    Plan:  Ambulation encouraged  Discharge later today    Reji Carrion     Interval History   Doing well.  Pain is well-controlled.  No fevers.  No history of wound drainage, warmth or significant erythema.  Good appetite.  Denies chest pain, shortness of breath, nausea or vomiting.  Ambulatory.  Breastfeeding well.    Medications     dextrose 5% lactated ringers Stopped (20 1156)     lactated ringers Stopped (20 1157)     NO Rho (D) immune globulin (RhoGam) needed - mother Rh POSITIVE       - MEDICATION INSTRUCTIONS -       oxytocin in 0.9% NaCl 100 mL/hr (20 1150)     oxytocin in 0.9% NaCl         acetaminophen  975 mg Oral Q6H     ibuprofen  800 mg Oral Q6H     senna-docusate  1 tablet Oral BID    Or     senna-docusate  2 tablet Oral BID     sodium chloride (PF)  3 mL Intracatheter Q8H       Physical Exam   Temp: 98  F (36.7  C) Temp src: Tympanic BP: 106/77 Pulse: 82 Heart Rate: 104 Resp: 16 SpO2: 96 % O2 Device: None (Room air)    Vitals:    20 1725   Weight: 106.6 kg (235 lb)     Vital Signs with Ranges  Temp:  [98  F (36.7  C)-98.3  F (36.8  C)] 98  F (36.7  C)  Pulse:  [82] 82  Heart Rate:  [104] 104  Resp:  [16] 16  BP: (106-110)/(67-77) 106/77  SpO2:  [96 %] 96 %  I/O last 3 completed shifts:  In: 1200 [P.O.:1200]  Out: 600 [Urine:600]    Uterine fundus is firm, non-tender and at the level of the umbilicus  Incision C/D/I  Extremities Non-tender    Data   Recent Labs   Lab Test 20  1744   ABO O   RH Pos   AS Neg     Recent Labs   Lab Test 20  0500 20  1744   HGB 8.1* 10.8*     No lab results found.

## 2020-08-01 NOTE — PROGRESS NOTES
Discharge Note      Data:  Suzy Miles discharged to home at 1145 via ambulation. Accompanied by significant other and staff.    Action:  Written discharge/follow-up instructions were provided to patient. Prescriptions sent to patients preferred pharmacy. All belongings sent with patient.    Response:  Patient verbalized understanding of discharge instructions, reason for discharge, and necessary follow-up appointments.

## 2020-08-03 ENCOUNTER — LACTATION ENCOUNTER (OUTPATIENT)
Age: 20
End: 2020-08-03

## 2020-08-03 ENCOUNTER — HOSPITAL ENCOUNTER (OUTPATIENT)
Dept: OBGYN | Facility: OTHER | Age: 20
End: 2020-08-03
Attending: OBSTETRICS & GYNECOLOGY
Payer: COMMERCIAL

## 2020-08-03 PROCEDURE — S9443 LACTATION CLASS: HCPCS | Performed by: REGISTERED NURSE

## 2020-08-03 NOTE — PROGRESS NOTES
Patient was given hydromorphone 0.5mg/0.5ml IV 7/30/20 at 0923 per Doctor order by Umu Godinez RN.

## 2020-08-03 NOTE — LACTATION NOTE
This note was copied from a baby's chart.  Outpatient Lactation Visit    Camilla Santizo  [id number]    Consultation Date: August 3, 2020     Reason for Lactation Referral: Initial Lactation Consult    Baby's : 2020    Baby's Current Age: 4 day old  Baby's Gestational Age: Gestational Age: 41w1d    Primary Care Provider: No primary care provider on file.    Presenting Problem (concerns as stated by parent): no concerns - repeat bilirubin level obtained    MATERNAL HISTORY   History of Breast Surgery: no  Breast Changes During Pregnancy: no  Breast Feeding History: primigravida  Maternal Meds: daily prenatal vitamin  Pregnancy Complications: none  Anesthesia during labor: spinal    MATERNAL ASSESSMENT    Breast Size: average, symmetrical, soft after feeding and filling prior to feeding  Nipple Appearance - Left: slightly cracked, with signs of healing, education on further healing techniques provided  Nipple Appearance - Right: slightly cracked, with signs of healing, education on further healing techniques provided  Nipple Erectility - Left: erect with stimulation  Nipple Erectility - Right: erect with stimulation  Areolas Compressibility: soft  Nipple Size: average  Special Equipment Used: none  Day mother reports milk came in:  Day 3    INFANT ASSESSMENT    Oral Anatomy  Mouth: normal  Palate: normal  Jaw: normal  Tongue: normal  Frenulum: normal   Digital Suck Exam: root    FEEDING   Feeding Time: aggressively for 20 minutes  Position:  cradle  Effort to Latch: awake and alert, latched easily  Duration of Breast Feeding: Right Breast: 0; Left Breast: 20 min  Results: excellent breast feed    Volume of Intake:    Birth Weight: 7 lb 10.6 oz    Hospital discharge weight: 7 lb 4.6 oz    Today's Weight 7 lb 6.7 oz    Total Intake: 1.5 oz  Output: 4-5 soil diapers in last 24 hours, 4-5 wet diapers in last 24 hours    LATCH Score:   Latch: 2 - Good Latch  Audible Swallowin - Spontaneous & frequent  Type  of Nipple: (Breast/Nipple) 2 - Everted  Comfort: 2 - Soft, Nontender  Hold: 2 - No Assist   Total LATCH Score:  10    FEEDING PLAN    Home Feeding Plan: Continue to feed on demand when  elicits feeding cues with deep latch.  Babe should be eating 8-12 times in a 24 hour period.  Exclusivity explained and encouraged in the early weeks to establish breastfeeding and order in milk supply.  Rooming-in encouraged with explanation of the benefits.  Continue to apply expressed breast milk and Lanolin cream to nipples after feedings for healing and comfort.  Postpartum breastfeeding assessment completed and education provided.  Items included in the education are:     proper positioning and latch    effectiveness of feeding    manual expression    handling and storing breastmilk    maintenance of breastfeeding for the first 6 months    sign/symptoms of infant feeding issues requiring referral to qualified health care provider    LACTATION COMMENTS   Bilirubin level at 12.3 mg/dl.  Dr. Gonzalez notified. Patient status reported. No new orders received.  Deep latch explained for proper positioning of breast in infant's mouth, maximizing milk transfer and comfort.  Reassurance and encouragement provided in regard to mom's concerns about milk supply.  Follow-up support information provided.  Parents plan to keep  Well-Child Check with Dr. Galvez as scheduled for 2 week well child check.      Face-to-face Time: 60 minutes with assessment and education.    Allie Disla RN  8/3/2020  2:33 PM

## 2020-08-10 ENCOUNTER — PATIENT OUTREACH (OUTPATIENT)
Dept: CARE COORDINATION | Facility: CLINIC | Age: 20
End: 2020-08-10

## 2020-08-10 NOTE — PROGRESS NOTES
Clinic Care Coordination Contact    Writer reached out to patient on this date to follow-up on how she and baby are doing at home, patient is adapting to baby's routine, states doing well.  Has [place] leg support in place.    Patient plans to reach out to the [facility] for financial support at this time, writer did educate her to potentially reach out to the County as [place] for services she might qualify for, we have discussed this in the past and she does have a County worker.    Patient states her plan is to call her resources today.  Writer offered support and directing her to appropriate people, patient says she has the individual she knows who to contact.    Writer will not be following patient after this date, due to living in [facility] and no longer utilizing [facility] in hospital for her primary care.    Writer has no concerns at this time, patient has resources in place.    HARI Webb on 8/10/2020 at 1:23 PM

## 2020-09-11 ENCOUNTER — PRENATAL OFFICE VISIT (OUTPATIENT)
Dept: OBGYN | Facility: OTHER | Age: 20
End: 2020-09-11
Attending: OBSTETRICS & GYNECOLOGY
Payer: COMMERCIAL

## 2020-09-11 VITALS
WEIGHT: 217.8 LBS | DIASTOLIC BLOOD PRESSURE: 60 MMHG | SYSTOLIC BLOOD PRESSURE: 90 MMHG | BODY MASS INDEX: 33.61 KG/M2 | HEART RATE: 78 BPM

## 2020-09-11 LAB — HCG UR QL: NEGATIVE

## 2020-09-11 PROCEDURE — 81025 URINE PREGNANCY TEST: CPT | Mod: ZL | Performed by: OBSTETRICS & GYNECOLOGY

## 2020-09-11 PROCEDURE — 25000128 H RX IP 250 OP 636: Performed by: OBSTETRICS & GYNECOLOGY

## 2020-09-11 PROCEDURE — 11981 INSERTION DRUG DLVR IMPLANT: CPT | Performed by: OBSTETRICS & GYNECOLOGY

## 2020-09-11 RX ADMIN — ETONOGESTREL 68 MG: 68 IMPLANT SUBCUTANEOUS at 14:26

## 2020-09-11 ASSESSMENT — PAIN SCALES - GENERAL: PAINLEVEL: NO PAIN (0)

## 2020-09-11 NOTE — NURSING NOTE
Patient here for 6 week postpartum care.   States she is doing alright.     Medication Reconciliation: complete    Wilbert Cordoba LPN  9/11/2020 12:43 PM

## 2020-09-11 NOTE — PROGRESS NOTES
6 week Postpartum Visit Note    S:  Ms. Suzy Miles is a 20 year old  here for her 6-week postpartum checkup.   - Had a PLTCS on 20 for arrest of dilation.  - Infant gender:  girl, weight 7 pounds 10.6 oz.  - Feeding Method:  .  Complications reported with feeding:  none, infant thriving .    - Bleeding:  None.  Duration:  2 weeks.  Menses resumed:  No  - Bowel/Urinary problems:  No  - Mood: good  - Sleep: good    - Contraception Planned:  Nexplanon  - She  has had intercourse since delivery and experienced  No discomfort.  .  Used condoms  - Current tobacco use:  No  - Hx of Abuse:  No  ================================================================  ROS: 10 point ROS neg other than the symptoms noted above in the HPI.     O:  BP 90/60 (BP Location: Right arm, Patient Position: Sitting, Cuff Size: Adult Regular)   Pulse 78   Wt 98.8 kg (217 lb 12.8 oz)   BMI 33.61 kg/m    Gen: Well-appearing, NAD  Psych:  Appropriate mood and affect  Breast: Deferred, no concerns  Abd:  Benign and Soft, flat, non-tender  Inc:   well healed     Procedure note:    Patient presents for placement of Nexplanon for contraception.  She has had been counseled on side effects, risks, benefits and alternatives.  Patient desires to proceed.    Verification of Procedure:  Just before the procedure begans through verbal and active participation of team members, I verified:     Initials   Patient Name Suzy Miles    Patient  2000    Procedure to be performed Nexplanon Insertion     Consent:  Risks, benefits of treatment, and alternative options for contraception were discussed.  Patient's questions were elicited and answered.  Written consent was obtained and scanned into medical record.     Patient was resting comfortably on exam table, left arm placed at shoulder level in a 90 degree angle.  Skin was marked 8cm from epicondyl and 3cm below groove and cleansed with betadine solution.  Insertion site  and track infiltrated with 2 cc 1% Lidocaine.      Nexplanon device visualized in applicator by patient and provider.  Skin punctured with applicator at insertion site and advanced with ease in the intradermal space.  Applicator was removed.  Nexplanon was palpated by provider and patient.    Small amount of bleeding noted at insertion site and no bruising noted along track of Nexplanon.  Bandage and pressure dressing applied to insertion site.       Patient tolerated procedure well.  Lot number [id number], Exp 2022.  To be removed/replaced by 2023.        A/P:  Ms. Suzy Miles is a 20 year old  here for 6 week postpartum visit after PLTCS. Doing well.  - Contraception: Nexplanon  - Feeding: breast  - Follow-up: annually or sooner bartolo Ospina MD  OB/GYN  2020 1:00 PM

## 2021-05-13 ENCOUNTER — PATIENT OUTREACH (OUTPATIENT)
Dept: CARE COORDINATION | Facility: CLINIC | Age: 21
End: 2021-05-13

## 2021-05-13 NOTE — PROGRESS NOTES
Clinic Care Coordination Contact    Direct writer will have staffing care team later this day, patients care coordination needs /concerns have resolved    HARI Webb on 5/13/2021 at 9:52 AM

## 2021-06-01 ENCOUNTER — RECORDS - HEALTHEAST (OUTPATIENT)
Dept: ADMINISTRATIVE | Facility: CLINIC | Age: 21
End: 2021-06-01

## 2021-09-17 ENCOUNTER — APPOINTMENT (OUTPATIENT)
Dept: GENERAL RADIOLOGY | Facility: OTHER | Age: 21
End: 2021-09-17
Attending: FAMILY MEDICINE
Payer: COMMERCIAL

## 2021-09-17 ENCOUNTER — HOSPITAL ENCOUNTER (EMERGENCY)
Facility: OTHER | Age: 21
Discharge: HOME | End: 2021-09-18
Attending: FAMILY MEDICINE | Admitting: FAMILY MEDICINE
Payer: COMMERCIAL

## 2021-09-17 DIAGNOSIS — S05.01XA ABRASION OF RIGHT CORNEA, INITIAL ENCOUNTER: ICD-10-CM

## 2021-09-17 DIAGNOSIS — L03.113 CELLULITIS OF RIGHT UPPER EXTREMITY: ICD-10-CM

## 2021-09-17 LAB
BASOPHILS # BLD AUTO: 0.1 10E3/UL (ref 0–0.2)
BASOPHILS NFR BLD AUTO: 1 %
EOSINOPHIL # BLD AUTO: 0.4 10E3/UL (ref 0–0.7)
EOSINOPHIL NFR BLD AUTO: 4 %
ERYTHROCYTE [DISTWIDTH] IN BLOOD BY AUTOMATED COUNT: 12.7 % (ref 10–15)
HCT VFR BLD AUTO: 38 % (ref 35–47)
HGB BLD-MCNC: 12.7 G/DL (ref 11.7–15.7)
IMM GRANULOCYTES # BLD: 0 10E3/UL
IMM GRANULOCYTES NFR BLD: 0 %
LYMPHOCYTES # BLD AUTO: 3.1 10E3/UL (ref 0.8–5.3)
LYMPHOCYTES NFR BLD AUTO: 32 %
MCH RBC QN AUTO: 30.6 PG (ref 26.5–33)
MCHC RBC AUTO-ENTMCNC: 33.4 G/DL (ref 31.5–36.5)
MCV RBC AUTO: 92 FL (ref 78–100)
MONOCYTES # BLD AUTO: 0.7 10E3/UL (ref 0–1.3)
MONOCYTES NFR BLD AUTO: 7 %
NEUTROPHILS # BLD AUTO: 5.5 10E3/UL (ref 1.6–8.3)
NEUTROPHILS NFR BLD AUTO: 56 %
NRBC # BLD AUTO: 0 10E3/UL
NRBC BLD AUTO-RTO: 0 /100
PLATELET # BLD AUTO: 269 10E3/UL (ref 150–450)
RBC # BLD AUTO: 4.15 10E6/UL (ref 3.8–5.2)
WBC # BLD AUTO: 9.8 10E3/UL (ref 4–11)

## 2021-09-17 PROCEDURE — 250N000013 HC RX MED GY IP 250 OP 250 PS 637: Performed by: FAMILY MEDICINE

## 2021-09-17 PROCEDURE — 36415 COLL VENOUS BLD VENIPUNCTURE: CPT | Performed by: FAMILY MEDICINE

## 2021-09-17 PROCEDURE — 99284 EMERGENCY DEPT VISIT MOD MDM: CPT | Mod: 25 | Performed by: FAMILY MEDICINE

## 2021-09-17 PROCEDURE — 73080 X-RAY EXAM OF ELBOW: CPT | Mod: RT

## 2021-09-17 PROCEDURE — 85025 COMPLETE CBC W/AUTO DIFF WBC: CPT | Performed by: FAMILY MEDICINE

## 2021-09-17 PROCEDURE — 99284 EMERGENCY DEPT VISIT MOD MDM: CPT | Performed by: FAMILY MEDICINE

## 2021-09-17 RX ORDER — IBUPROFEN 400 MG/1
800 TABLET, FILM COATED ORAL ONCE
Status: COMPLETED | OUTPATIENT
Start: 2021-09-17 | End: 2021-09-17

## 2021-09-17 RX ORDER — SULFAMETHOXAZOLE AND TRIMETHOPRIM 400; 80 MG/1; MG/1
1 TABLET ORAL
COMMUNITY
Start: 2021-09-15 | End: 2021-09-23

## 2021-09-17 RX ORDER — SILVER SULFADIAZINE 1 %
CREAM (GRAM) TOPICAL
COMMUNITY
Start: 2021-09-15 | End: 2024-01-17

## 2021-09-17 RX ORDER — ACETAMINOPHEN 500 MG
1000 TABLET ORAL ONCE
Status: COMPLETED | OUTPATIENT
Start: 2021-09-17 | End: 2021-09-17

## 2021-09-17 RX ORDER — SILVER SULFADIAZINE 10 MG/G
CREAM TOPICAL
COMMUNITY
Start: 2021-09-15 | End: 2024-01-17

## 2021-09-17 RX ORDER — SULFAMETHOXAZOLE/TRIMETHOPRIM 800-160 MG
1 TABLET ORAL ONCE
Status: COMPLETED | OUTPATIENT
Start: 2021-09-17 | End: 2021-09-17

## 2021-09-17 RX ADMIN — IBUPROFEN 800 MG: 400 TABLET, FILM COATED ORAL at 23:35

## 2021-09-17 RX ADMIN — SULFAMETHOXAZOLE AND TRIMETHOPRIM 1 TABLET: 800; 160 TABLET ORAL at 23:36

## 2021-09-17 RX ADMIN — ACETAMINOPHEN 1000 MG: 500 TABLET, FILM COATED ORAL at 23:35

## 2021-09-17 ASSESSMENT — ENCOUNTER SYMPTOMS
WOUND: 1
JOINT SWELLING: 1
FEVER: 0
SHORTNESS OF BREATH: 0
ABDOMINAL PAIN: 0
PALPITATIONS: 0
NUMBNESS: 0
DIARRHEA: 0
VOMITING: 0
NAUSEA: 0
BRUISES/BLEEDS EASILY: 0
COLOR CHANGE: 1
DYSURIA: 0
EYE REDNESS: 0
SORE THROAT: 0
COUGH: 0
NECK PAIN: 0
ARTHRALGIAS: 1
WEAKNESS: 0
BACK PAIN: 0

## 2021-09-17 NOTE — Clinical Note
Suzy Miles was seen and treated in our emergency department on 9/17/2021.  She may return to work on 09/20/2021.       If you have any questions or concerns, please don't hesitate to call.      Scotty Tariq MD

## 2021-09-18 VITALS
OXYGEN SATURATION: 98 % | TEMPERATURE: 98.5 F | RESPIRATION RATE: 18 BRPM | HEART RATE: 87 BPM | SYSTOLIC BLOOD PRESSURE: 121 MMHG | DIASTOLIC BLOOD PRESSURE: 80 MMHG

## 2021-09-18 LAB
HOLD SPECIMEN: NORMAL

## 2021-09-18 RX ORDER — SULFAMETHOXAZOLE/TRIMETHOPRIM 800-160 MG
1 TABLET ORAL 2 TIMES DAILY
Qty: 20 TABLET | Refills: 0 | Status: SHIPPED | OUTPATIENT
Start: 2021-09-18 | End: 2024-01-17

## 2021-09-18 RX ORDER — IBUPROFEN 800 MG/1
800 TABLET, FILM COATED ORAL EVERY 8 HOURS PRN
Qty: 24 TABLET | Refills: 0 | Status: SHIPPED | OUTPATIENT
Start: 2021-09-18 | End: 2024-01-17

## 2021-09-18 RX ORDER — SULFAMETHOXAZOLE/TRIMETHOPRIM 800-160 MG
1 TABLET ORAL 2 TIMES DAILY
Qty: 20 TABLET | Refills: 0 | Status: SHIPPED | OUTPATIENT
Start: 2021-09-18 | End: 2021-09-18

## 2021-09-18 RX ORDER — IBUPROFEN 800 MG/1
800 TABLET, FILM COATED ORAL EVERY 8 HOURS PRN
Qty: 24 TABLET | Refills: 0 | Status: SHIPPED | OUTPATIENT
Start: 2021-09-18 | End: 2021-09-18
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
## 2022-09-03 ENCOUNTER — HOSPITAL ENCOUNTER (EMERGENCY)
Facility: OTHER | Age: 22
Discharge: HOME | End: 2022-09-03
Attending: PHYSICIAN ASSISTANT | Admitting: PHYSICIAN ASSISTANT
Payer: COMMERCIAL

## 2022-09-03 VITALS
BODY MASS INDEX: 33.18 KG/M2 | TEMPERATURE: 97.6 F | OXYGEN SATURATION: 98 % | DIASTOLIC BLOOD PRESSURE: 74 MMHG | RESPIRATION RATE: 16 BRPM | WEIGHT: 215 LBS | HEART RATE: 90 BPM | SYSTOLIC BLOOD PRESSURE: 119 MMHG

## 2022-09-03 DIAGNOSIS — Z20.2 POSSIBLE EXPOSURE TO STD: ICD-10-CM

## 2022-09-03 DIAGNOSIS — N76.0 BACTERIAL VAGINAL INFECTION: ICD-10-CM

## 2022-09-03 DIAGNOSIS — B96.89 BACTERIAL VAGINAL INFECTION: ICD-10-CM

## 2022-09-03 LAB
ALBUMIN UR-MCNC: NEGATIVE MG/DL
AMORPH CRY #/AREA URNS HPF: ABNORMAL /HPF
APPEARANCE UR: ABNORMAL
BACTERIA #/AREA URNS HPF: ABNORMAL /HPF
BILIRUB UR QL STRIP: NEGATIVE
C TRACH DNA SPEC QL PROBE+SIG AMP: ABNORMAL
CLUE CELLS: ABNORMAL
COLOR UR AUTO: YELLOW
GLUCOSE UR STRIP-MCNC: NEGATIVE MG/DL
HCG UR QL: NEGATIVE
HGB UR QL STRIP: NEGATIVE
HIV 1+2 AB+HIV1P24 AG SERPLBLD IA.RAPID: NON REACTIVE
HIV 1+2 AB+HIV1P24 AG SERPLBLD IA.RAPID: NON REACTIVE
HIV INTERPRETATION: NORMAL
HOLD SPECIMEN: NORMAL
KETONES UR STRIP-MCNC: NEGATIVE MG/DL
LEUKOCYTE ESTERASE UR QL STRIP: NEGATIVE
MUCOUS THREADS #/AREA URNS LPF: PRESENT /LPF
N GONORRHOEA DNA SPEC QL NAA+PROBE: ABNORMAL
NITRATE UR QL: NEGATIVE
PH UR STRIP: 7.5 [PH] (ref 5–9)
RBC URINE: 3 /HPF
SP GR UR STRIP: 1.02 (ref 1–1.03)
TRICHOMONAS, WET PREP: ABNORMAL
UROBILINOGEN UR STRIP-MCNC: NORMAL MG/DL
WBC URINE: 4 /HPF
WBC'S/HIGH POWER FIELD, WET PREP: ABNORMAL
YEAST, WET PREP: ABNORMAL

## 2022-09-03 PROCEDURE — 99284 EMERGENCY DEPT VISIT MOD MDM: CPT | Mod: 25 | Performed by: PHYSICIAN ASSISTANT

## 2022-09-03 PROCEDURE — 86780 TREPONEMA PALLIDUM: CPT | Performed by: PHYSICIAN ASSISTANT

## 2022-09-03 PROCEDURE — 87591 N.GONORRHOEAE DNA AMP PROB: CPT | Performed by: PHYSICIAN ASSISTANT

## 2022-09-03 PROCEDURE — 87210 SMEAR WET MOUNT SALINE/INK: CPT | Performed by: PHYSICIAN ASSISTANT

## 2022-09-03 PROCEDURE — 87389 HIV-1 AG W/HIV-1&-2 AB AG IA: CPT | Performed by: PHYSICIAN ASSISTANT

## 2022-09-03 PROCEDURE — 86706 HEP B SURFACE ANTIBODY: CPT | Performed by: PHYSICIAN ASSISTANT

## 2022-09-03 PROCEDURE — 87806 HIV AG W/HIV1&2 ANTB W/OPTIC: CPT | Performed by: PHYSICIAN ASSISTANT

## 2022-09-03 PROCEDURE — 99283 EMERGENCY DEPT VISIT LOW MDM: CPT | Performed by: PHYSICIAN ASSISTANT

## 2022-09-03 PROCEDURE — 86803 HEPATITIS C AB TEST: CPT | Performed by: PHYSICIAN ASSISTANT

## 2022-09-03 PROCEDURE — 36415 COLL VENOUS BLD VENIPUNCTURE: CPT | Performed by: PHYSICIAN ASSISTANT

## 2022-09-03 PROCEDURE — 81001 URINALYSIS AUTO W/SCOPE: CPT | Performed by: PHYSICIAN ASSISTANT

## 2022-09-03 PROCEDURE — 81025 URINE PREGNANCY TEST: CPT | Performed by: PHYSICIAN ASSISTANT

## 2022-09-03 PROCEDURE — 250N000011 HC RX IP 250 OP 636: Performed by: PHYSICIAN ASSISTANT

## 2022-09-03 PROCEDURE — 99284 EMERGENCY DEPT VISIT MOD MDM: CPT | Performed by: PHYSICIAN ASSISTANT

## 2022-09-03 PROCEDURE — 96372 THER/PROPH/DIAG INJ SC/IM: CPT | Performed by: PHYSICIAN ASSISTANT

## 2022-09-03 PROCEDURE — 87340 HEPATITIS B SURFACE AG IA: CPT | Performed by: PHYSICIAN ASSISTANT

## 2022-09-03 RX ORDER — DOXYCYCLINE 100 MG/1
100 CAPSULE ORAL 2 TIMES DAILY
Qty: 14 CAPSULE | Refills: 0 | Status: SHIPPED | OUTPATIENT
Start: 2022-09-03 | End: 2022-09-03 | Stop reason: ALTCHOICE

## 2022-09-03 RX ORDER — METRONIDAZOLE 500 MG/1
500 TABLET ORAL 2 TIMES DAILY
Qty: 14 TABLET | Refills: 0 | Status: SHIPPED | OUTPATIENT
Start: 2022-09-03 | End: 2022-09-10

## 2022-09-03 RX ORDER — CEFTRIAXONE SODIUM 1 G
1 VIAL (EA) INJECTION ONCE
Status: COMPLETED | OUTPATIENT
Start: 2022-09-03 | End: 2022-09-03

## 2022-09-03 RX ORDER — DOXYCYCLINE 100 MG/1
100 CAPSULE ORAL ONCE
Status: DISCONTINUED | OUTPATIENT
Start: 2022-09-03 | End: 2022-09-03

## 2022-09-03 RX ADMIN — CEFTRIAXONE SODIUM 1 G: 1 INJECTION, POWDER, FOR SOLUTION INTRAMUSCULAR; INTRAVENOUS at 15:22

## 2022-09-03 ASSESSMENT — ACTIVITIES OF DAILY LIVING (ADL)
ADLS_ACUITY_SCORE: 35
ADLS_ACUITY_SCORE: 33

## 2022-09-03 NOTE — ED PROVIDER NOTES
History     Chief Complaint   Patient presents with     Vaginal Problem     Exposure to STD     HPI  Suzy Miles is a 22 year old female who is requesting STD testing due to some sore she has in her  area.  She reports that these started 2 days ago.  She has had some purulent vaginal drainage as well.  She has no known exposure to STDs.  No fever or chills.  No nausea or vomiting.  She is currently breast-feeding.    Allergies:  No Known Allergies    Problem List:    Patient Active Problem List    Diagnosis Date Noted     S/P  section 2020     Priority: Medium     Encounter for induction of labor 2020     Priority: Medium     Encounter for triage in pregnant patient 2020     Priority: Medium     Major depression 10/05/2019     Priority: Medium        Past Medical History:    Past Medical History:   Diagnosis Date     Anxiety      Depression      Ovarian cyst        Past Surgical History:    Past Surgical History:   Procedure Laterality Date      SECTION N/A 2020    Procedure:  SECTION;  Surgeon: Eva Ospina MD;  Location:  OR     NO HISTORY OF SURGERY         Family History:    Family History   Problem Relation Age of Onset     Ovarian cysts Mother      Diabetes Maternal Grandmother        Social History:  Marital Status:  Single [1]  Social History     Tobacco Use     Smoking status: Former Smoker     Packs/day: 0.25     Quit date: 11/15/2019     Years since quittin.8     Smokeless tobacco: Never Used   Substance Use Topics     Alcohol use: Not Currently     Drug use: Never        Medications:    B Complex Vitamins (VITAMIN-B COMPLEX PO)  cholecalciferol (VITAMIN D3) 125 MCG (5000 UT) TABS tablet  Ferrous Gluconate (IRON 27) 240 (27 Fe) MG TABS  ibuprofen (ADVIL/MOTRIN) 600 MG tablet  ibuprofen (ADVIL/MOTRIN) 800 MG tablet  Prenatal Vit-Fe Fumarate-FA (PRENATAL MULTIVITAMIN W/IRON) 27-0.8 MG tablet  senna-docusate (SENOKOT-S/PERICOLACE) 8.6-50 MG  tablet  silver sulfADIAZINE (SILVADENE) 1 % external cream  SSD 1 % external cream  sulfamethoxazole-trimethoprim (BACTRIM DS) 800-160 MG tablet          Review of Systems    Physical Exam   BP: 119/74  Pulse: 90  Temp: 97.6  F (36.4  C)  Resp: 16  Weight: 97.5 kg (215 lb)  SpO2: 98 %      Physical Exam  Vitals and nursing note reviewed.   Constitutional:       General: She is not in acute distress.     Appearance: Normal appearance. She is not ill-appearing.   HENT:      Head: Normocephalic.      Nose: Nose normal.   Cardiovascular:      Rate and Rhythm: Normal rate.   Pulmonary:      Effort: Pulmonary effort is normal. No respiratory distress.      Breath sounds: No stridor.      Comments: SaO2 is 98% on room air.  She does not appear to be in any respiratory distress.  No tachypnea  Genitourinary:     General: Normal vulva.      Vagina: Vaginal discharge present.      Comments: I do not appreciate any vaginal lesions.  She does have some greenish-white vaginal discharge and a wet prep was obtained  Musculoskeletal:         General: Normal range of motion.      Cervical back: Normal range of motion.   Skin:     Coloration: Skin is not jaundiced or pale.   Neurological:      General: No focal deficit present.      Mental Status: She is alert and oriented to person, place, and time.         ED Course     Results for orders placed or performed during the hospital encounter of 09/03/22 (from the past 24 hour(s))   Extra Tube    Narrative    The following orders were created for panel order Extra Tube.  Procedure                               Abnormality         Status                     ---------                               -----------         ------                     Extra Purple Top Tube[[id number]]                            Final result                 Please view results for these tests on the individual orders.   Extra Purple Top Tube   Result Value Ref Range    Hold Specimen [facility]    HIV Rapid Antibody Screen    Result Value Ref Range    HIV-1/HIV-2 Antibody Non Reactive Non Reactive    HIV1 P24 Antigen Non Reactive Non Reactive    HIV Interpretation      Narrative    All positive rapid HIV 1/2 Ag/Ab samples need to be confirmed by a second HIV 1/2 Ag/Ab Combination test.   Chlamydia trachomatis/Neisseria gonorrhoeae by PCR    Specimen: Cervix; Urine   Result Value Ref Range    Chlamydia Trachomatis Negative Negative    Neisseria gonorrhoeae Negative Negative    Narrative    Assay performed using [employer] real-time, reverse-transcriptase PCR.   Wet prep    Specimen: Vagina; Swab   Result Value Ref Range    Trichomonas Absent Absent    Yeast Absent Absent    Clue Cells Absent Absent    WBCs/high power field 4+ (A) None   UA with Microscopic reflex to Culture    Specimen: Urine, Clean Catch   Result Value Ref Range    Color Urine Yellow Colorless, Straw, Light Yellow, Yellow    Appearance Urine Cloudy (A) Clear    Glucose Urine Negative Negative mg/dL    Bilirubin Urine Negative Negative    Ketones Urine Negative Negative mg/dL    Specific Gravity Urine 1.019 1.000 - 1.030    Blood Urine Negative Negative    pH Urine 7.5 5.0 - 9.0    Protein Albumin Urine Negative Negative mg/dL    Urobilinogen Urine Normal Normal, 2.0 mg/dL    Nitrite Urine Negative Negative    Leukocyte Esterase Urine Negative Negative    Bacteria Urine Few (A) None Seen /HPF    Mucus Urine Present (A) None Seen /LPF    Amorphous Crystals Urine Few (A) None Seen /HPF    RBC Urine 3 (H) <=2 /HPF    WBC Urine 4 <=5 /HPF    Narrative    Urine Culture not indicated   HCG qualitative urine   Result Value Ref Range    hCG Urine Qualitative Negative Negative       Medications   cefTRIAXone (ROCEPHIN) in lidocaine 1% (PF) injection 1 g (has no administration in time range)   doxycycline hyclate (VIBRAMYCIN) capsule 100 mg (has no administration in time range)       Assessments & Plan (with Medical Decision Making)     I have reviewed the nursing notes.    I have  reviewed the findings, diagnosis, plan and need for follow up with the patient.      New Prescriptions    METRONIDAZOLE (FLAGYL) 500 MG TABLET    Take 1 tablet (500 mg) by mouth 2 times daily for 7 days       Final diagnoses:   Possible exposure to STD   Bacterial vaginal infection     22 year old female who is requesting STD testing due to some sore she has in her  area.  She reports that these started 2 days ago.  She has had some purulent vaginal drainage as well.  She has no known exposure to STDs.  She is concerned for STDs and wants to be tested.  She is currently breast-feeding.  Physical exam shows no obvious lesion.  She does have some greenish-white purulent vaginal discharge and a wet prep was obtained.  Her UA is cloudy with few bacteria but unequivocal for UTI.  No hematuria.  Her rapid HIV returns negative.  Her hCG is negative.  We will treat empirically for her exposure to STIs and she was given Rocephin IM as well as a first dose of doxycycline.  We were attempting to arrange a machine for her to pump and dump her breast milk while on antibiotics when her gonorrhea and chlamydia returned negative.  No need to be on doxycycline at this point.  We will just treat her empirically for bacterial vaginosis.  She was given a prescription for Flagyl.  After discussion and consultation with pharmacy the patient will pump and dump for 48 hours after being done with her Flagyl prior to resuming breast feeding.  The patient was given a breast pump and instructed on how to use this.  Rx for 7-day course of Flagyl.  We are still waiting other tests but these are send outs.  Return if there is any concerns for further evaluation as needed.  I explained my diagnostic considerations and recommendations and the patient voiced an understanding and was in agreement with the treatment plan. All questions were answered to the best of my ability.  We discussed potential side effects of any prescribed or recommended  therapies, as well as expectations for response to treatments.  10:25 PM-contacted by main lab that the brush used to obtain the first gonorrhea and chlamydia sample was the wrong brush.  This voided the initial lab which showed by PCR to be negative chlamydia and negative gonorrhea.  The patient did have a UTI and so there was enough urine on hand and this was rerun with urine and returns with negative chlamydia and negative gonorrhea in her urine which is reassuring.  No change in care.          9/3/2022   [facility] [place]     David Ahmadi PA-C  09/03/22 1524       David Ahmadi PA-C  09/03/22 2295

## 2022-09-04 LAB — T PALLIDUM AB SER QL: NONREACTIVE

## 2022-09-05 LAB
HBV SURFACE AB SERPL IA-ACNC: 0 M[IU]/ML
HBV SURFACE AB SERPL IA-ACNC: NONREACTIVE M[IU]/ML
HBV SURFACE AG SERPL QL IA: NONREACTIVE
HCV AB SERPL QL IA: NONREACTIVE
HIV 1+2 AB+HIV1 P24 AG SERPL QL IA: NONREACTIVE

## 2023-06-09 ENCOUNTER — OFFICE VISIT (OUTPATIENT)
Dept: FAMILY MEDICINE | Facility: OTHER | Age: 23
End: 2023-06-09
Attending: NURSE PRACTITIONER
Payer: COMMERCIAL

## 2023-06-09 VITALS
WEIGHT: 203.6 LBS | DIASTOLIC BLOOD PRESSURE: 68 MMHG | RESPIRATION RATE: 20 BRPM | BODY MASS INDEX: 30.86 KG/M2 | OXYGEN SATURATION: 99 % | HEART RATE: 81 BPM | TEMPERATURE: 98.5 F | SYSTOLIC BLOOD PRESSURE: 132 MMHG | HEIGHT: 68 IN

## 2023-06-09 DIAGNOSIS — F33.2 SEVERE EPISODE OF RECURRENT MAJOR DEPRESSIVE DISORDER, WITHOUT PSYCHOTIC FEATURES (H): ICD-10-CM

## 2023-06-09 DIAGNOSIS — J02.9 SORE THROAT: ICD-10-CM

## 2023-06-09 DIAGNOSIS — R05.1 ACUTE COUGH: Primary | ICD-10-CM

## 2023-06-09 LAB
FLUAV RNA SPEC QL NAA+PROBE: NEGATIVE
FLUBV RNA RESP QL NAA+PROBE: NEGATIVE
GROUP A STREP BY PCR: NOT DETECTED
RSV RNA SPEC NAA+PROBE: NEGATIVE
SARS-COV-2 RNA RESP QL NAA+PROBE: NEGATIVE

## 2023-06-09 PROCEDURE — C9803 HOPD COVID-19 SPEC COLLECT: HCPCS | Performed by: NURSE PRACTITIONER

## 2023-06-09 PROCEDURE — G0463 HOSPITAL OUTPT CLINIC VISIT: HCPCS

## 2023-06-09 PROCEDURE — 99214 OFFICE O/P EST MOD 30 MIN: CPT | Performed by: NURSE PRACTITIONER

## 2023-06-09 PROCEDURE — 87637 SARSCOV2&INF A&B&RSV AMP PRB: CPT | Mod: ZL | Performed by: NURSE PRACTITIONER

## 2023-06-09 PROCEDURE — 87651 STREP A DNA AMP PROBE: CPT | Mod: ZL | Performed by: NURSE PRACTITIONER

## 2023-06-09 ASSESSMENT — ENCOUNTER SYMPTOMS
APPETITE CHANGE: 0
VOICE CHANGE: 1
SORE THROAT: 1
FEVER: 0
GASTROINTESTINAL NEGATIVE: 1
COUGH: 1
CHOKING: 0
MUSCULOSKELETAL NEGATIVE: 1
ACTIVITY CHANGE: 0
FATIGUE: 1
TROUBLE SWALLOWING: 1
RHINORRHEA: 1
NEUROLOGICAL NEGATIVE: 1

## 2023-06-09 ASSESSMENT — PATIENT HEALTH QUESTIONNAIRE - PHQ9
SUM OF ALL RESPONSES TO PHQ QUESTIONS 1-9: 24
10. IF YOU CHECKED OFF ANY PROBLEMS, HOW DIFFICULT HAVE THESE PROBLEMS MADE IT FOR YOU TO DO YOUR WORK, TAKE CARE OF THINGS AT HOME, OR GET ALONG WITH OTHER PEOPLE: EXTREMELY DIFFICULT
SUM OF ALL RESPONSES TO PHQ QUESTIONS 1-9: 24

## 2023-06-09 ASSESSMENT — PAIN SCALES - GENERAL: PAINLEVEL: NO PAIN (0)
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
## 2023-06-09 NOTE — NURSING NOTE
"No chief complaint on file.        Initial /68 (BP Location: Right arm, Patient Position: Sitting, Cuff Size: Adult Regular)   Pulse 81   Temp 98.5  F (36.9  C) (Temporal)   Resp 20   Ht 1.727 m (5' 8\")   Wt 92.4 kg (203 lb 9.6 oz)   LMP 06/07/2023 (Exact Date)   SpO2 99%   Breastfeeding No   BMI 30.96 kg/m   Estimated body mass index is 30.96 kg/m  as calculated from the following:    Height as of this encounter: 1.727 m (5' 8\").    Weight as of this encounter: 92.4 kg (203 lb 9.6 oz).       FOOD SECURITY SCREENING QUESTIONS:    The next two questions are to help us understand your food security.  If you are feeling you need any assistance in this area, we have resources available to support you today.    Hunger Vital Signs:  Within the past 12 months we worried whether our food would run out before we got money to buy more. Never  Within the past 12 months the food we bought just didn't last and we didn't have money to get more. Never    Advance Care Directive on file? no      Medication reconciliation complete.      Yrn Gonzalez,on 6/9/2023 at 12:23 PM          "

## 2023-06-09 NOTE — PATIENT INSTRUCTIONS
Flonase nasal spray, twice daily.  This is an intranasal steroid.  This will help with sinus congestion and postnasal drip.  Afrin nasal spray is also an option.  This is a little bit stronger. I would not recommend using this longer than 3 days at this and cause rebound congestion.  Consider picking up a daily nondrowsy antihistamine such as Allegra, Claritin or Zyrtec.  This should be taken once daily.  This can help with sinus drainage, congestion which should also improve sore throat and reduce drainage causing a cough.  Stay well hydrated. Push water. If you need to flavor the water that is ok.  Caffeine does not help with dehydration, this actually worsens dehydration.  Avoid pop and coffee.  Adequate rest.  Your body needs time to recover to fight off this infection.  Tylenol (acetaminophen) and NSAIDS (Aleve, Advil, ibuprofen) as needed for pain, discomfort, sore throat, headache, fever, chills or body aches.  Ibuprofen 400 mg-600 mg every 6 hours as needed pain/headache or fever.  Acetaminophen  (Tylenol) 500 mg or 650 mg every 4 hours.  For severe pain may use extra strength acetaminophen 1000 mg every 8 hours.

## 2023-06-26 ENCOUNTER — HOSPITAL ENCOUNTER (EMERGENCY)
Facility: OTHER | Age: 23
Discharge: HOME | End: 2023-06-27
Attending: STUDENT IN AN ORGANIZED HEALTH CARE EDUCATION/TRAINING PROGRAM | Admitting: STUDENT IN AN ORGANIZED HEALTH CARE EDUCATION/TRAINING PROGRAM
Payer: COMMERCIAL

## 2023-06-26 VITALS
HEART RATE: 87 BPM | BODY MASS INDEX: 30.87 KG/M2 | SYSTOLIC BLOOD PRESSURE: 118 MMHG | DIASTOLIC BLOOD PRESSURE: 80 MMHG | TEMPERATURE: 98.1 F | RESPIRATION RATE: 18 BRPM | WEIGHT: 203 LBS | OXYGEN SATURATION: 98 %

## 2023-06-26 DIAGNOSIS — J02.0 STREP THROAT: ICD-10-CM

## 2023-06-26 PROCEDURE — 99283 EMERGENCY DEPT VISIT LOW MDM: CPT | Performed by: STUDENT IN AN ORGANIZED HEALTH CARE EDUCATION/TRAINING PROGRAM

## 2023-06-27 LAB
BASOPHILS # BLD AUTO: 0 10E3/UL (ref 0–0.2)
BASOPHILS NFR BLD AUTO: 0 %
CRP SERPL-MCNC: 8.18 MG/L
EOSINOPHIL # BLD AUTO: 0 10E3/UL (ref 0–0.7)
EOSINOPHIL NFR BLD AUTO: 0 %
ERYTHROCYTE [DISTWIDTH] IN BLOOD BY AUTOMATED COUNT: 12.8 % (ref 10–15)
GROUP A STREP BY PCR: DETECTED
HCT VFR BLD AUTO: 34 % (ref 35–47)
HGB BLD-MCNC: 11.4 G/DL (ref 11.7–15.7)
HOLD SPECIMEN: NORMAL
HOLD SPECIMEN: NORMAL
IMM GRANULOCYTES # BLD: 0 10E3/UL
IMM GRANULOCYTES NFR BLD: 0 %
LYMPHOCYTES # BLD AUTO: 1.9 10E3/UL (ref 0.8–5.3)
LYMPHOCYTES NFR BLD AUTO: 17 %
MCH RBC QN AUTO: 31 PG (ref 26.5–33)
MCHC RBC AUTO-ENTMCNC: 33.5 G/DL (ref 31.5–36.5)
MCV RBC AUTO: 92 FL (ref 78–100)
MONOCYTES # BLD AUTO: 1 10E3/UL (ref 0–1.3)
MONOCYTES NFR BLD AUTO: 9 %
MONOCYTES NFR BLD AUTO: NEGATIVE %
NEUTROPHILS # BLD AUTO: 8.5 10E3/UL (ref 1.6–8.3)
NEUTROPHILS NFR BLD AUTO: 74 %
NRBC # BLD AUTO: 0 10E3/UL
NRBC BLD AUTO-RTO: 0 /100
PLATELET # BLD AUTO: 268 10E3/UL (ref 150–450)
RBC # BLD AUTO: 3.68 10E6/UL (ref 3.8–5.2)
WBC # BLD AUTO: 11.6 10E3/UL (ref 4–11)

## 2023-06-27 PROCEDURE — 250N000013 HC RX MED GY IP 250 OP 250 PS 637: Performed by: STUDENT IN AN ORGANIZED HEALTH CARE EDUCATION/TRAINING PROGRAM

## 2023-06-27 PROCEDURE — 86140 C-REACTIVE PROTEIN: CPT | Performed by: STUDENT IN AN ORGANIZED HEALTH CARE EDUCATION/TRAINING PROGRAM

## 2023-06-27 PROCEDURE — 86308 HETEROPHILE ANTIBODY SCREEN: CPT | Performed by: STUDENT IN AN ORGANIZED HEALTH CARE EDUCATION/TRAINING PROGRAM

## 2023-06-27 PROCEDURE — 85025 COMPLETE CBC W/AUTO DIFF WBC: CPT | Performed by: STUDENT IN AN ORGANIZED HEALTH CARE EDUCATION/TRAINING PROGRAM

## 2023-06-27 PROCEDURE — 87651 STREP A DNA AMP PROBE: CPT | Performed by: STUDENT IN AN ORGANIZED HEALTH CARE EDUCATION/TRAINING PROGRAM

## 2023-06-27 PROCEDURE — 36415 COLL VENOUS BLD VENIPUNCTURE: CPT | Performed by: STUDENT IN AN ORGANIZED HEALTH CARE EDUCATION/TRAINING PROGRAM

## 2023-06-27 RX ORDER — AMOXICILLIN 500 MG/1
500 CAPSULE ORAL ONCE
Status: COMPLETED | OUTPATIENT
Start: 2023-06-27 | End: 2023-06-27

## 2023-06-27 RX ORDER — AMOXICILLIN 500 MG/1
500 CAPSULE ORAL 2 TIMES DAILY
Qty: 19 CAPSULE | Refills: 0 | Status: SHIPPED | OUTPATIENT
Start: 2023-06-27 | End: 2023-07-07

## 2023-06-27 RX ADMIN — AMOXICILLIN 500 MG: 500 CAPSULE ORAL at 01:38

## 2023-06-27 ASSESSMENT — ACTIVITIES OF DAILY LIVING (ADL): ADLS_ACUITY_SCORE: 35

## 2023-06-27 NOTE — ED PROVIDER NOTES
History     Chief Complaint   Patient presents with     Pharyngitis       Suzy Miles is a 22 year old female who presents with tonsillitis.  A couple weeks ago developed pain in the back of her throat.  Had negative strep test and was told this was likely allergy related.  Pain subsequently resolved but appearance of tonsils has not improved and is concerning to patient.  They are enlarged and abnormal looking.  Denies any other symptoms including fever, chills, nausea, vomiting, facial pain, rhinorrhea, dysphagia, fatigue, halitosis.    No Known Allergies    Patient Active Problem List    Diagnosis Date Noted     S/P  section 2020     Priority: Medium     Encounter for induction of labor 2020     Priority: Medium     Encounter for triage in pregnant patient 2020     Priority: Medium     Major depression 10/05/2019     Priority: Medium       Past Medical History:   Diagnosis Date     Anxiety      Depression      Ovarian cyst        Past Surgical History:   Procedure Laterality Date      SECTION N/A 2020    Procedure:  SECTION;  Surgeon: Eva Ospina MD;  Location:  OR     NO HISTORY OF SURGERY         Family History   Problem Relation Age of Onset     Ovarian cysts Mother      Diabetes Maternal Grandmother        Social History     Tobacco Use     Smoking status: Former     Packs/day: 0.25     Types: Cigarettes     Quit date: 11/15/2019     Years since quitting: 3.6     Smokeless tobacco: Never   Vaping Use     Vaping Use: Never used   Substance Use Topics     Alcohol use: Not Currently     Drug use: Never       Medications:    amoxicillin (AMOXIL) 500 MG capsule  B Complex Vitamins (VITAMIN-B COMPLEX PO)  cholecalciferol (VITAMIN D3) 125 MCG (5000 UT) TABS tablet  Ferrous Gluconate (IRON 27) 240 (27 Fe) MG TABS  ibuprofen (ADVIL/MOTRIN) 600 MG tablet  ibuprofen (ADVIL/MOTRIN) 800 MG tablet  Prenatal Vit-Fe Fumarate-FA (PRENATAL MULTIVITAMIN W/IRON)  27-0.8 MG tablet  senna-docusate (SENOKOT-S/PERICOLACE) 8.6-50 MG tablet  silver sulfADIAZINE (SILVADENE) 1 % external cream  SSD 1 % external cream  sulfamethoxazole-trimethoprim (BACTRIM DS) 800-160 MG tablet        Review of Systems: See HPI for pertinent negatives and positives. All other systems reviewed and found to be negative.    Physical Exam   /80   Pulse 87   Temp 98.1  F (36.7  C) (Tympanic)   Resp 18   Wt 92.1 kg (203 lb)   LMP 06/07/2023 (Exact Date)   SpO2 98%   BMI 30.87 kg/m       General: awake, comfortable  HEENT: atraumatic, supple neck, no trismus, bilateral symmetrically enlarged tonsils with exudates greater on  left, uvula midline, no posterior pharynx erythema or swelling, tooth #16 decay  Respiratory: normal effort  Cardiovascular: Appears well-perfused  Extremities: no deformities, edema  Skin: warm, dry, no rashes  Neuro: alert, no focal deficits  Psych: appropriate mood and affect    ED Course           Results for orders placed or performed during the hospital encounter of 06/26/23 (from the past 24 hour(s))   Group A Streptococcus PCR Throat Swab    Specimen: Throat; Swab   Result Value Ref Range    Group A strep by PCR Detected (A) Not Detected    Narrative    The Xpert Xpress Strep A test, performed on the [employer], is a rapid, qualitative in vitro diagnostic test for the detection of Streptococcus pyogenes (Group A ß-hemolytic Streptococcus, Strep A) in throat swab specimens from patients with signs and symptoms of pharyngitis. The Xpert Xpress Strep A test can be used as an aid in the diagnosis of Group A Streptococcal pharyngitis. The assay is not intended to monitor treatment for Group A Streptococcus infections. The Xpert Xpress Strep A test utilizes an automated real-time polymerase chain reaction (PCR) to detect Streptococcus pyogenes DNA.   Mononucleosis screen   Result Value Ref Range    Mononucleosis Screen Negative Negative   CBC with  platelets differential    Narrative    The following orders were created for panel order CBC with platelets differential.  Procedure                               Abnormality         Status                     ---------                               -----------         ------                     CBC with platelets and d...[[id number]]  Abnormal            Final result                 Please view results for these tests on the individual orders.   CRP inflammation   Result Value Ref Range    CRP Inflammation 8.18 (H) <5.00 mg/L   Extra Tube    Narrative    The following orders were created for panel order Extra Tube.  Procedure                               Abnormality         Status                     ---------                               -----------         ------                     Extra Blue Top Tube[[id number]]                              In process                 Extra Red Top Tube[[id number]]                               In process                   Please view results for these tests on the individual orders.   CBC with platelets and differential   Result Value Ref Range    WBC Count 11.6 (H) 4.0 - 11.0 10e3/uL    RBC Count 3.68 (L) 3.80 - 5.20 10e6/uL    Hemoglobin 11.4 (L) 11.7 - 15.7 g/dL    Hematocrit 34.0 (L) 35.0 - 47.0 %    MCV 92 78 - 100 fL    MCH 31.0 26.5 - 33.0 pg    MCHC 33.5 31.5 - 36.5 g/dL    RDW 12.8 10.0 - 15.0 %    Platelet Count 268 150 - 450 10e3/uL    % Neutrophils 74 %    % Lymphocytes 17 %    % Monocytes 9 %    % Eosinophils 0 %    % Basophils 0 %    % Immature Granulocytes 0 %    NRBCs per 100 WBC 0 <1 /100    Absolute Neutrophils 8.5 (H) 1.6 - 8.3 10e3/uL    Absolute Lymphocytes 1.9 0.8 - 5.3 10e3/uL    Absolute Monocytes 1.0 0.0 - 1.3 10e3/uL    Absolute Eosinophils 0.0 0.0 - 0.7 10e3/uL    Absolute Basophils 0.0 0.0 - 0.2 10e3/uL    Absolute Immature Granulocytes 0.0 <=0.4 10e3/uL    Absolute NRBCs 0.0 10e3/uL       Medications   amoxicillin (AMOXIL) capsule 500 mg (500 mg Oral  $Given 6/27/23 5562)     Assessments & Plan (with Medical Decision Making)     I have reviewed the nursing notes.    22 year old female evaluated for enlarged tonsils with exudates.  Initially symptomatic a couple weeks ago but now asymptomatic.  Abnormal appearance of tonsils has persisted.  Positive for strep here.  Treating with amoxicillin per plan below.  Discharged home with attached instructions on diagnosis including ED return precautions.     I have reviewed the findings, diagnosis, plan, and need for any follow up with the patient.    New Prescriptions    AMOXICILLIN (AMOXIL) 500 MG CAPSULE    Take 1 capsule (500 mg) by mouth 2 times daily for 19 doses       Final diagnoses:   Strep throat       6/27/2023   [facility]     Munir Stack MD  06/27/23 0143

## 2023-06-27 NOTE — DISCHARGE INSTRUCTIONS
Please complete antibiotic prescription. Recommend daily yogurt or probiotic while taking antibiotics to avoid potential antibiotic side effects including diarrhea.     Your tonsils and the white spots on these should clear up with treatment with antibiotic.  The antibiotic should help with any potential tooth infection too.    Please review attached instructions including reasons to return to the emergency department.

## 2023-06-27 NOTE — ED TRIAGE NOTES
"ED Nursing Triage Note (General)   ________________________________    Suzy Miles is a 22 year old Female that presents to triage via private vehicle with complaints of pharyngitis.  Patient states pain and swelling.  Patient states she was previously seen in the clinic, however, is unclear when that was.  Patient states no change, however, states things are just not getting better which is what prompted her to come to the ED today.  No fevers noted at home or on arrival.  Patient denies any other symptoms at this time and states, \"I feel fine otherwise\".  Patient does state, however, she is also having tooth pain and states hx of having 2 teeth removed.  Patient states to staff, \"I was wondering if the doctor would be able to give me something for that.  Arely been eating ibuprofen like crazy and I dont want to fuck up my liver\".   Significant symptoms had onset 2 weeks ago.  LMP 06/07/2023 (Exact Date)       [facility] SITU[facility]-home      "

## 2023-07-25 ENCOUNTER — OFFICE VISIT (OUTPATIENT)
Dept: FAMILY MEDICINE | Facility: OTHER | Age: 23
End: 2023-07-25
Payer: COMMERCIAL

## 2023-07-25 VITALS
TEMPERATURE: 98.5 F | SYSTOLIC BLOOD PRESSURE: 142 MMHG | BODY MASS INDEX: 32.24 KG/M2 | RESPIRATION RATE: 18 BRPM | DIASTOLIC BLOOD PRESSURE: 82 MMHG | WEIGHT: 205.4 LBS | HEIGHT: 67 IN | HEART RATE: 106 BPM | OXYGEN SATURATION: 99 %

## 2023-07-25 DIAGNOSIS — Z72.51 HISTORY OF UNPROTECTED SEX: ICD-10-CM

## 2023-07-25 DIAGNOSIS — F32.A ANXIETY AND DEPRESSION: Primary | ICD-10-CM

## 2023-07-25 DIAGNOSIS — F41.9 ANXIETY AND DEPRESSION: Primary | ICD-10-CM

## 2023-07-25 LAB
C TRACH DNA SPEC QL PROBE+SIG AMP: NEGATIVE
N GONORRHOEA DNA SPEC QL NAA+PROBE: NEGATIVE

## 2023-07-25 PROCEDURE — 99214 OFFICE O/P EST MOD 30 MIN: CPT

## 2023-07-25 PROCEDURE — 87491 CHLMYD TRACH DNA AMP PROBE: CPT | Mod: ZL

## 2023-07-25 PROCEDURE — 87591 N.GONORRHOEAE DNA AMP PROB: CPT | Mod: ZL

## 2023-07-25 PROCEDURE — G0463 HOSPITAL OUTPT CLINIC VISIT: HCPCS

## 2023-07-25 RX ORDER — LEVONORGESTREL 1.5 MG/1
1.5 TABLET ORAL ONCE
Qty: 1 TABLET | Refills: 0 | Status: SHIPPED | OUTPATIENT
Start: 2023-07-25 | End: 2024-01-17

## 2023-07-25 RX ORDER — SERTRALINE HYDROCHLORIDE 25 MG/1
25 TABLET, FILM COATED ORAL DAILY
Qty: 30 TABLET | Refills: 1 | Status: SHIPPED | OUTPATIENT
Start: 2023-07-25 | End: 2024-01-17

## 2023-07-25 ASSESSMENT — PATIENT HEALTH QUESTIONNAIRE - PHQ9
SUM OF ALL RESPONSES TO PHQ QUESTIONS 1-9: 23
SUM OF ALL RESPONSES TO PHQ QUESTIONS 1-9: 23
10. IF YOU CHECKED OFF ANY PROBLEMS, HOW DIFFICULT HAVE THESE PROBLEMS MADE IT FOR YOU TO DO YOUR WORK, TAKE CARE OF THINGS AT HOME, OR GET ALONG WITH OTHER PEOPLE: EXTREMELY DIFFICULT

## 2023-07-25 ASSESSMENT — PAIN SCALES - GENERAL: PAINLEVEL: NO PAIN (0)

## 2023-07-25 NOTE — NURSING NOTE
"Chief Complaint   Patient presents with    morning after medication      Patient presents to the clinic requesting the morning after medication, she last had intercourse yesterday morning.     Patient is concerned that she had undiagnosed BPD and she stated she though she was on a \"manic episode\" for about 4 days ended this morning she stated that she is fearful of her safety. She sated that she feels like she dissociates and doesn't feel feelings she feels \"numb\" she described it as a switch.     Lorraine Espinosa LPN       FOOD SECURITY SCREENING QUESTIONS:    The next two questions are to help us understand your food security.  If you are feeling you need any assistance in this area, we have resources available to support you today.    Hunger Vital Signs:  Within the past 12 months we worried whether our food would run out before we got money to buy more. Often  Within the past 12 months the food we bought just didn't last and we didn't have money to get more. Often    Food Insecurity: Not on file     Patient was given a food bag.     "

## 2023-07-25 NOTE — PROGRESS NOTES
ASSESSMENT/PLAN:    I have reviewed the nursing notes.  I have reviewed the findings, diagnosis, plan and need for follow up with the patient.    1. Anxiety and depression  - Adult Mental Health  Referral; Future  - sertraline (ZOLOFT) 25 MG tablet; Take 1 tablet (25 mg) by mouth daily  Dispense: 30 tablet; Refill: 1    Patient has a history of anxiety and depression.  She has not been on any medications for her anxiety and depression since before she had her child 3 years ago.  She denies any SI or HI and states that she feels safe in her home.  Will start patient on sertraline.  A referral was placed to mental health for patient to get established and assessed.  Provided patient with information on nonpharmacological ways to manage her anxiety and depression.    2. History of unprotected sex  - levonorgestrel (PLAN B) 1.5 MG tablet; Take 1 tablet (1.5 mg) by mouth once for 1 dose  Dispense: 1 tablet; Refill: 0  - GC/Chlamydia by PCR    Patient has a history of recent unprotected sex.  Patient would like the Plan B tablet as she states she cannot afford to get it over-the-counter.  Provided patient with a prescription for Plan B.  Patient also wanted STD testing denies any previous STDs or current STD symptoms.  Patient will be notified of the results of her testing once available.  Discussed following safe sex practices in the future.    Discussed warning signs/symptoms indicative of need to f/u    Follow up if symptoms persist or worsen or concerns    I explained my diagnostic considerations and recommendations to the patient, who voiced understanding and agreement with the treatment plan. All questions were answered. We discussed potential side effects of any prescribed or recommended therapies, as well as expectations for response to treatments.    30 minutes was spent gathering patient information, reviewing patient's chart, ordering and reviewing results, performing physical exam, discussing  "treatment options, and providing patient education.    AZEB Fisher CNP  2023  2:31 PM    HPI:    Suzy Miles is a 22 year old female  who presents to [facility] today for concerns of STD testing and mental health issues    Patient states that she had unprotected sex with a new partner yesterday morning. Patient is not on any form of birth control. She states her LMP was 23. She denies any previous STDs and denies any current symptoms. She denies any urinary or vaginal symptoms. She is requesting plan B as she does not have enough money to buy it OTC.     Patient also states that she has anxiety and depression and has not been on medications since before she had her daughter three years ago.  Patient does not recall which medication she was on but according to her medication list it appears that she was on BuSpar in the past as needed for her anxiety.  Patient denies any SI or HI.  She also states that she does feel safe in her home.  Patient states that she has felt that she may have bipolar disorder and feels as if she has been in a \"manic\" episode the past few days which is why she had unprotected sex yesterday.  She states she does feel paranoid at times.  She does not currently see a counselor or therapist and is not following with a primary care provider.    Answers submitted by the patient for this visit:  Patient Health Questionnaire (Submitted on 2023)  If you checked off any problems, how difficult have these problems made it for you to do your work, take care of things at home, or get along with other people?: Extremely difficult  PHQ9 TOTAL SCORE: 23    Past Medical History:   Diagnosis Date    Anxiety     Depression     Ovarian cyst      Past Surgical History:   Procedure Laterality Date     SECTION N/A 2020    Procedure:  SECTION;  Surgeon: Eva Ospina MD;  Location: [facility] OR    NO HISTORY OF SURGERY       Social History     Tobacco Use    " Smoking status: Some Days     Packs/day: 0.25     Types: Cigarettes     Last attempt to quit: 11/15/2019     Years since quitting: 3.6    Smokeless tobacco: Never   Substance Use Topics    Alcohol use: Yes     Comment: will drink whole bottle of vodka through out the whole day with beer also about 1-2 times a week     Current Outpatient Medications   Medication Sig Dispense Refill    B Complex Vitamins (VITAMIN-B COMPLEX PO) Take 1 tablet by mouth daily (Patient not taking: Reported on 6/9/2023)      cholecalciferol (VITAMIN D3) 125 MCG (5000 UT) TABS tablet Take by mouth daily (Patient not taking: Reported on 6/9/2023)      Ferrous Gluconate (IRON 27) 240 (27 Fe) MG TABS Take 1 tablet by mouth daily (Patient not taking: Reported on 6/9/2023) 30 tablet 0    ibuprofen (ADVIL/MOTRIN) 600 MG tablet Take 1 tablet (600 mg) by mouth every 6 hours (Patient not taking: Reported on 6/9/2023) 30 tablet 0    ibuprofen (ADVIL/MOTRIN) 800 MG tablet Take 1 tablet (800 mg) by mouth every 8 hours as needed for moderate pain (Patient not taking: Reported on 6/9/2023) 24 tablet 0    Prenatal Vit-Fe Fumarate-FA (PRENATAL MULTIVITAMIN W/IRON) 27-0.8 MG tablet Take 1 tablet by mouth daily (Patient not taking: Reported on 6/9/2023)      senna-docusate (SENOKOT-S/PERICOLACE) 8.6-50 MG tablet Take 1 tablet by mouth 2 times daily as needed for constipation (Patient not taking: Reported on 9/11/2020) 30 tablet 0    silver sulfADIAZINE (SILVADENE) 1 % external cream  (Patient not taking: Reported on 6/9/2023)      SSD 1 % external cream  (Patient not taking: Reported on 6/9/2023)      sulfamethoxazole-trimethoprim (BACTRIM DS) 800-160 MG tablet Take 1 tablet by mouth 2 times daily (Patient not taking: Reported on 6/9/2023) 20 tablet 0     No Known Allergies  Past medical history, past surgical history, current medications and allergies reviewed and accurate to the best of my knowledge.      ROS:  Refer to HPI    BP (!) 142/82 (BP Location:  "Right arm, Patient Position: Sitting, Cuff Size: Adult Regular)   Pulse 106   Temp 98.5  F (36.9  C) (Tympanic)   Resp 18   Ht 1.69 m (5' 6.54\")   Wt 93.2 kg (205 lb 6.4 oz)   LMP 07/10/2023 (Exact Date)   SpO2 99%   BMI 32.62 kg/m      EXAM:  General Appearance: Well appearing 22 year old female, appropriate appearance for age. No acute distress   Respiratory: normal chest wall and respirations.  Normal effort.  Clear to auscultation bilaterally, no wheezing, crackles or rhonchi.  No increased work of breathing.  No cough appreciated.  Cardiac: RRR with no murmurs  Musculoskeletal:  Equal movement of bilateral upper extremities.  Equal movement of bilateral lower extremities.  Normal gait.    Dermatological: no rashes noted of exposed skin  Neuro: Alert and oriented to person, place, and time.    Psychological: normal affect, alert, oriented, and pleasant.     Labs:  pending      "

## 2023-07-27 ENCOUNTER — TELEPHONE (OUTPATIENT)
Dept: FAMILY MEDICINE | Facility: OTHER | Age: 23
End: 2023-07-27
Payer: COMMERCIAL

## 2023-07-27 NOTE — TELEPHONE ENCOUNTER
Returned a call    Left message with negative results.  Martha Heath LPN LPN....................  7/27/2023   5:37 PM

## 2023-07-27 NOTE — TELEPHONE ENCOUNTER
Reason for call: Request for results.    Name of test or procedure: Urine test    Date of test or procedure: 07/25/2023    Location of test or procedure: [facility]    Preferred method for responding to this message: Telephone Call    Phone number patient can be reached at: Cell number on file:    Telephone Information:   Mobile [phone]       If we can't reach you directly, may we leave a detailed response at the number you provided?Yes    Gabriela Pearce on 7/27/2023 at 11:39 AM

## 2023-11-04 ENCOUNTER — OFFICE VISIT (OUTPATIENT)
Dept: FAMILY MEDICINE | Facility: OTHER | Age: 23
End: 2023-11-04
Payer: COMMERCIAL

## 2023-11-04 VITALS
OXYGEN SATURATION: 99 % | DIASTOLIC BLOOD PRESSURE: 82 MMHG | WEIGHT: 206.6 LBS | HEART RATE: 82 BPM | TEMPERATURE: 97.4 F | HEIGHT: 67 IN | SYSTOLIC BLOOD PRESSURE: 130 MMHG | RESPIRATION RATE: 16 BRPM | BODY MASS INDEX: 32.43 KG/M2

## 2023-11-04 DIAGNOSIS — N76.0 BV (BACTERIAL VAGINOSIS): ICD-10-CM

## 2023-11-04 DIAGNOSIS — B37.31 CANDIDIASIS OF VAGINA: ICD-10-CM

## 2023-11-04 DIAGNOSIS — Z11.3 SCREEN FOR STD (SEXUALLY TRANSMITTED DISEASE): Primary | ICD-10-CM

## 2023-11-04 DIAGNOSIS — B96.89 BV (BACTERIAL VAGINOSIS): ICD-10-CM

## 2023-11-04 LAB
BACTERIAL VAGINOSIS VAG-IMP: POSITIVE
C TRACH DNA SPEC QL PROBE+SIG AMP: NEGATIVE
CANDIDA DNA VAG QL NAA+PROBE: DETECTED
CANDIDA GLABRATA / CANDIDA KRUSEI DNA: NOT DETECTED
N GONORRHOEA DNA SPEC QL NAA+PROBE: NEGATIVE
T VAGINALIS DNA VAG QL NAA+PROBE: NOT DETECTED

## 2023-11-04 PROCEDURE — 86780 TREPONEMA PALLIDUM: CPT | Mod: ZL | Performed by: STUDENT IN AN ORGANIZED HEALTH CARE EDUCATION/TRAINING PROGRAM

## 2023-11-04 PROCEDURE — 86706 HEP B SURFACE ANTIBODY: CPT | Mod: ZL | Performed by: STUDENT IN AN ORGANIZED HEALTH CARE EDUCATION/TRAINING PROGRAM

## 2023-11-04 PROCEDURE — 87529 HSV DNA AMP PROBE: CPT | Mod: ZL | Performed by: STUDENT IN AN ORGANIZED HEALTH CARE EDUCATION/TRAINING PROGRAM

## 2023-11-04 PROCEDURE — 99214 OFFICE O/P EST MOD 30 MIN: CPT | Performed by: STUDENT IN AN ORGANIZED HEALTH CARE EDUCATION/TRAINING PROGRAM

## 2023-11-04 PROCEDURE — 0352U MULTIPLEX VAGINAL PANEL BY PCR: CPT | Mod: ZL | Performed by: STUDENT IN AN ORGANIZED HEALTH CARE EDUCATION/TRAINING PROGRAM

## 2023-11-04 PROCEDURE — 87389 HIV-1 AG W/HIV-1&-2 AB AG IA: CPT | Mod: ZL | Performed by: STUDENT IN AN ORGANIZED HEALTH CARE EDUCATION/TRAINING PROGRAM

## 2023-11-04 PROCEDURE — G0463 HOSPITAL OUTPT CLINIC VISIT: HCPCS

## 2023-11-04 PROCEDURE — 86803 HEPATITIS C AB TEST: CPT | Mod: ZL | Performed by: STUDENT IN AN ORGANIZED HEALTH CARE EDUCATION/TRAINING PROGRAM

## 2023-11-04 PROCEDURE — 36415 COLL VENOUS BLD VENIPUNCTURE: CPT | Mod: ZL | Performed by: STUDENT IN AN ORGANIZED HEALTH CARE EDUCATION/TRAINING PROGRAM

## 2023-11-04 PROCEDURE — 86696 HERPES SIMPLEX TYPE 2 TEST: CPT | Mod: ZL | Performed by: STUDENT IN AN ORGANIZED HEALTH CARE EDUCATION/TRAINING PROGRAM

## 2023-11-04 PROCEDURE — 87491 CHLMYD TRACH DNA AMP PROBE: CPT | Mod: ZL | Performed by: STUDENT IN AN ORGANIZED HEALTH CARE EDUCATION/TRAINING PROGRAM

## 2023-11-04 ASSESSMENT — PAIN SCALES - GENERAL: PAINLEVEL: NO PAIN (0)

## 2023-11-04 NOTE — PROGRESS NOTES
Patient presents to the clinic for std screening. Patient states she has a couple marks in vaginal area and irregular periods. She is unsure of std exposure.         FOOD SECURITY SCREENING QUESTIONS  Hunger Vital Signs:  Within the past 12 months we worried whether our food would run out before we got money to buy more. Never  Within the past 12 months the food we bought just didn't last and we didn't have money to get more. Never  Medication Reconciliation: complete  Portia Escalante CMA 11/4/2023 5:22 PM

## 2023-11-04 NOTE — PROGRESS NOTES
"  Assessment & Plan     (Z11.3) Screen for STD (sexually transmitted disease)  (primary encounter diagnosis)    Comment: Screening for STDs.  She is requesting both vaginal as well as blood-borne STDs.  Discussed HSV antibody testing, she would like testing for this as well and knows that if it returns positive it does not necessarily mean treatment.  Vaginal swab of that \"scratch\" area was obtained for HSV PCR testing.  It appears unlikely to be that at this time.    Plan: GC/Chlamydia by PCR, Treponema Abs w Reflex to         RPR and Titer, HIV Antigen Antibody Combo         Cascade, Hepatitis C Screen Reflex to HCV RNA         Quant and Genotype, Hepatitis B Surface         Antibody, Herpes Simplex Virus 1 and 2 IgG,         Multiplex Vaginal Panel by PCR, Herpes Simplex         Virus 1&2 by PCR, CANCELED: Herpes Simplex         Virus 1&2 by PCR          Plan to treat based on results.    (B37.31) Candidiasis of vagina    Comment: Vaginal swab positive for candidiasis.  Called and discussed results with patient.    Plan: fluconazole (DIFLUCAN) 150 MG tablet          Diflucan prescribed, she denies possibility of pregnancy.  One dose today and then one dose in 3 more days as she is also being prescribed antibiotic therapy.    (N76.0,  B96.89) BV (bacterial vaginosis)    Comment: Swab positive for bacterial vaginosis.  Called and discussed results with patient.    Plan: metroNIDAZOLE (FLAGYL) 500 MG tablet          Plan to treat with metronidazole twice a day for 7 days.  Follow-up with PCP if symptoms persist.  [facility] worsen or change.  She is comfortable agreeable with this plan.      Mami Henderson PA-C  [facility] AND [facility] is a 23 year old, presenting for the following health issues:  std screening      HPI     Patient presents today with abnormal vaginal discharge as well as abnormal \"scratches\" on the anterior portion of the vaginal canal.  " "States she noticed them over the past couple of days.  No bleeding or open areas noted.  She has not had any lower pelvic pain.  No urinary symptoms.  Requesting STD testing today      Review of Systems   Constitutional, HEENT, cardiovascular, pulmonary, gi and gu systems are negative, except as otherwise noted.      Objective    /82 (BP Location: Left arm)   Pulse 82   Temp 97.4  F (36.3  C) (Tympanic)   Resp 16   Ht 1.702 m (5' 7\")   Wt 93.7 kg (206 lb 9.6 oz)   SpO2 99%   BMI 32.36 kg/m    Body mass index is 32.36 kg/m .    Physical Exam   GENERAL: healthy, alert and no distress  RESP: lungs clear to auscultation - no rales, rhonchi or wheezes  CV: regular rate and rhythm, normal S1 S2, no S3 or S4, no murmur, click or rub, no peripheral edema and peripheral pulses strong  ABDOMEN: soft, nontender, no hepatosplenomegaly, no masses and bowel sounds normal   (female): normal female external genitalia, normal urethral meatus, vaginal mucosa with very faint red linear marks on the anterior vulvar area just inside the entrance of the canal, no scabbing or ulcerative-like lesions, speculum deferred  MS: no gross musculoskeletal defects noted, no edema      Results for orders placed or performed in visit on 11/04/23   Treponema Abs w Reflex to RPR and Titer     Status: Normal   Result Value Ref Range    Treponema Antibody Total Nonreactive Nonreactive   GC/Chlamydia by PCR     Status: Normal    Specimen: Urine, Voided   Result Value Ref Range    Chlamydia Trachomatis Negative Negative    Neisseria gonorrhoeae Negative Negative    Narrative    Assay performed using [employer] real-time, reverse-transcriptase PCR.   Multiplex Vaginal Panel by PCR     Status: Abnormal    Specimen: Vagina; Swab   Result Value Ref Range    Bacterial Vaginosis Organism DNA Positive (A) Negative    Candida Group DNA Detected (A) Not Detected    Candida glabrata / Lissett krusei DNA Not Detected Not Detected    Trichomonas vaginalis " DNA Not Detected Not Detected    Narrative    The Xpert  Xpress MVP test, performed on the [employer], is an automated, qualitative in vitro diagnostic test for the detection of DNA targets from anaerobic bacteria associated with bacterial vaginosis, Candida species associated with vulvovaginal candidiasis, and Trichomonas vaginalis. The assay uses clinician-collected and self-collected vaginal swabs from patients who are symptomatic for vaginitis/ vaginosis. The Xpert  Xpress MVP test utilizes real-time polymerase chain reaction (PCR) for the amplification of specific DNA targets and utilizes fluorogenic target-specific hybridization probes to detect and differentiate DNA. It is intended to aid in the diagnosis of vaginal infections in women with a clinical presentation consistent with bacterial vaginosis, vulvovaginal candidiasis, or trichomoniasis.   The assay targets three anaerobic microorgansims that are associated with bacterial vaginosis (BV). Other organisms that are not detected by the Xpert  Xpress MVP test have also been reported to be associated with BV. The BV organism and Candida species targets of the Xpert  Xpress MVP test can be commensal in women; positive results must be considered in conjunction with other clinical and patient information to determine the disease status.

## 2023-11-05 ENCOUNTER — TELEPHONE (OUTPATIENT)
Dept: FAMILY MEDICINE | Facility: OTHER | Age: 23
End: 2023-11-05
Payer: COMMERCIAL

## 2023-11-05 LAB — T PALLIDUM AB SER QL: NONREACTIVE

## 2023-11-05 RX ORDER — METRONIDAZOLE 500 MG/1
500 TABLET ORAL 2 TIMES DAILY
Qty: 14 TABLET | Refills: 0 | Status: SHIPPED | OUTPATIENT
Start: 2023-11-05 | End: 2023-11-12

## 2023-11-05 RX ORDER — FLUCONAZOLE 150 MG/1
150 TABLET ORAL
Qty: 2 TABLET | Refills: 0 | Status: SHIPPED | OUTPATIENT
Start: 2023-11-05 | End: 2023-11-09

## 2023-11-06 DIAGNOSIS — A60.00 GENITAL HERPES SIMPLEX, UNSPECIFIED SITE: Primary | ICD-10-CM

## 2023-11-06 LAB
HBV SURFACE AB SERPL IA-ACNC: 0.53 M[IU]/ML
HBV SURFACE AB SERPL IA-ACNC: NONREACTIVE M[IU]/ML
HCV AB SERPL QL IA: NONREACTIVE
HIV 1+2 AB+HIV1 P24 AG SERPL QL IA: NONREACTIVE
HSV1 DNA SPEC QL NAA+PROBE: NOT DETECTED
HSV1 IGG SERPL QL IA: 0.09 INDEX
HSV1 IGG SERPL QL IA: ABNORMAL
HSV2 DNA SPEC QL NAA+PROBE: DETECTED
HSV2 IGG SERPL QL IA: 4.54 INDEX
HSV2 IGG SERPL QL IA: ABNORMAL

## 2023-11-06 RX ORDER — VALACYCLOVIR HYDROCHLORIDE 1 G/1
1000 TABLET, FILM COATED ORAL 2 TIMES DAILY
Qty: 20 TABLET | Refills: 0 | Status: SHIPPED | OUTPATIENT
Start: 2023-11-06 | End: 2024-01-09

## 2024-01-09 ENCOUNTER — TELEPHONE (OUTPATIENT)
Dept: FAMILY MEDICINE | Facility: OTHER | Age: 24
End: 2024-01-09
Payer: COMMERCIAL

## 2024-01-09 DIAGNOSIS — A60.00 GENITAL HERPES SIMPLEX, UNSPECIFIED SITE: ICD-10-CM

## 2024-01-09 RX ORDER — VALACYCLOVIR HYDROCHLORIDE 1 G/1
1000 TABLET, FILM COATED ORAL 2 TIMES DAILY
Qty: 20 TABLET | Refills: 0 | Status: SHIPPED | OUTPATIENT
Start: 2024-01-09

## 2024-01-09 NOTE — TELEPHONE ENCOUNTER
Patient notified of providers note. No questions or concerns. Patient verbalized understanding.   Radha Mcbride LPN on 1/9/2024 at 1:52 PM

## 2024-01-09 NOTE — TELEPHONE ENCOUNTER
Reason for call: Medication or medication refill    Name of medication requested: VALTREX    How many days of medication do you have left? NONE    What pharmacy do you use? [facility]    Preferred method for responding to this message: Telephone Call    Phone number patient can be reached at: Cell number on file:    Telephone Information:   Mobile [phone]       If we cannot reach you directly, may we leave a detailed response at the number you provided? No     PATIENT WAS GIVEN A ONE TIME RX FOR VALTREX. SHE IS STILL EXPERIENCING INTERMITTENT SYMPTOMS, BUT IS SEEING [facility] ON 1/17/24 TO ESTABLISH CARE. WOULD LIKE IT REFILLED BEFORE THEN IF POSSIBLE    Milagro Munoz on 1/9/2024 at 11:55 AM

## 2024-01-17 ENCOUNTER — OFFICE VISIT (OUTPATIENT)
Dept: FAMILY MEDICINE | Facility: OTHER | Age: 24
End: 2024-01-17
Attending: PHYSICIAN ASSISTANT
Payer: COMMERCIAL

## 2024-01-17 VITALS
TEMPERATURE: 97 F | WEIGHT: 213 LBS | HEIGHT: 67 IN | DIASTOLIC BLOOD PRESSURE: 74 MMHG | BODY MASS INDEX: 33.43 KG/M2 | SYSTOLIC BLOOD PRESSURE: 118 MMHG | HEART RATE: 81 BPM | OXYGEN SATURATION: 99 % | RESPIRATION RATE: 16 BRPM

## 2024-01-17 DIAGNOSIS — R53.82 CHRONIC FATIGUE: ICD-10-CM

## 2024-01-17 DIAGNOSIS — Z00.00 ENCOUNTER FOR MEDICAL EXAMINATION TO ESTABLISH CARE: ICD-10-CM

## 2024-01-17 DIAGNOSIS — F30.9 MANIA (H): ICD-10-CM

## 2024-01-17 DIAGNOSIS — Z71.6 ENCOUNTER FOR TOBACCO USE CESSATION COUNSELING: ICD-10-CM

## 2024-01-17 DIAGNOSIS — Z12.4 SCREENING FOR CERVICAL CANCER: ICD-10-CM

## 2024-01-17 DIAGNOSIS — Z00.00 ROUTINE GENERAL MEDICAL EXAMINATION AT A HEALTH CARE FACILITY: Primary | ICD-10-CM

## 2024-01-17 LAB
ALBUMIN SERPL BCG-MCNC: 4.4 G/DL (ref 3.5–5.2)
ALP SERPL-CCNC: 72 U/L (ref 40–150)
ALT SERPL W P-5'-P-CCNC: 32 U/L (ref 0–50)
ANION GAP SERPL CALCULATED.3IONS-SCNC: 9 MMOL/L (ref 7–15)
AST SERPL W P-5'-P-CCNC: 30 U/L (ref 0–45)
BASOPHILS # BLD AUTO: 0 10E3/UL (ref 0–0.2)
BASOPHILS NFR BLD AUTO: 0 %
BILIRUB SERPL-MCNC: 0.4 MG/DL
BUN SERPL-MCNC: 7.4 MG/DL (ref 6–20)
CALCIUM SERPL-MCNC: 9.2 MG/DL (ref 8.6–10)
CHLORIDE SERPL-SCNC: 104 MMOL/L (ref 98–107)
CREAT SERPL-MCNC: 0.61 MG/DL (ref 0.51–0.95)
DEPRECATED HCO3 PLAS-SCNC: 25 MMOL/L (ref 22–29)
EGFRCR SERPLBLD CKD-EPI 2021: >90 ML/MIN/1.73M2
EOSINOPHIL # BLD AUTO: 0.1 10E3/UL (ref 0–0.7)
EOSINOPHIL NFR BLD AUTO: 1 %
ERYTHROCYTE [DISTWIDTH] IN BLOOD BY AUTOMATED COUNT: 13.2 % (ref 10–15)
GLUCOSE SERPL-MCNC: 93 MG/DL (ref 70–99)
HCT VFR BLD AUTO: 37.4 % (ref 35–47)
HGB BLD-MCNC: 12.7 G/DL (ref 11.7–15.7)
IMM GRANULOCYTES # BLD: 0 10E3/UL
IMM GRANULOCYTES NFR BLD: 0 %
LYMPHOCYTES # BLD AUTO: 2.2 10E3/UL (ref 0.8–5.3)
LYMPHOCYTES NFR BLD AUTO: 24 %
MCH RBC QN AUTO: 31.8 PG (ref 26.5–33)
MCHC RBC AUTO-ENTMCNC: 34 G/DL (ref 31.5–36.5)
MCV RBC AUTO: 94 FL (ref 78–100)
MONOCYTES # BLD AUTO: 0.7 10E3/UL (ref 0–1.3)
MONOCYTES NFR BLD AUTO: 8 %
NEUTROPHILS # BLD AUTO: 6.1 10E3/UL (ref 1.6–8.3)
NEUTROPHILS NFR BLD AUTO: 67 %
NRBC # BLD AUTO: 0 10E3/UL
NRBC BLD AUTO-RTO: 0 /100
PLATELET # BLD AUTO: 307 10E3/UL (ref 150–450)
POTASSIUM SERPL-SCNC: 4.2 MMOL/L (ref 3.4–5.3)
PROT SERPL-MCNC: 7.5 G/DL (ref 6.4–8.3)
RBC # BLD AUTO: 4 10E6/UL (ref 3.8–5.2)
SODIUM SERPL-SCNC: 138 MMOL/L (ref 135–145)
TSH SERPL DL<=0.005 MIU/L-ACNC: 0.94 UIU/ML (ref 0.3–4.2)
WBC # BLD AUTO: 9.2 10E3/UL (ref 4–11)

## 2024-01-17 PROCEDURE — 82306 VITAMIN D 25 HYDROXY: CPT | Mod: ZL | Performed by: PHYSICIAN ASSISTANT

## 2024-01-17 PROCEDURE — 85025 COMPLETE CBC W/AUTO DIFF WBC: CPT | Mod: ZL | Performed by: PHYSICIAN ASSISTANT

## 2024-01-17 PROCEDURE — 80053 COMPREHEN METABOLIC PANEL: CPT | Mod: ZL | Performed by: PHYSICIAN ASSISTANT

## 2024-01-17 PROCEDURE — 99395 PREV VISIT EST AGE 18-39: CPT | Performed by: PHYSICIAN ASSISTANT

## 2024-01-17 PROCEDURE — 99214 OFFICE O/P EST MOD 30 MIN: CPT | Mod: 25 | Performed by: PHYSICIAN ASSISTANT

## 2024-01-17 PROCEDURE — 84443 ASSAY THYROID STIM HORMONE: CPT | Mod: ZL | Performed by: PHYSICIAN ASSISTANT

## 2024-01-17 PROCEDURE — 36415 COLL VENOUS BLD VENIPUNCTURE: CPT | Mod: ZL | Performed by: PHYSICIAN ASSISTANT

## 2024-01-17 PROCEDURE — G0463 HOSPITAL OUTPT CLINIC VISIT: HCPCS

## 2024-01-17 PROCEDURE — G0123 SCREEN CERV/VAG THIN LAYER: HCPCS | Performed by: PHYSICIAN ASSISTANT

## 2024-01-17 RX ORDER — MULTIVIT-MIN/IRON FUM/FOLIC AC 7.5 MG-4
1 TABLET ORAL DAILY
COMMUNITY
Start: 2024-01-17

## 2024-01-17 ASSESSMENT — ANXIETY QUESTIONNAIRES
5. BEING SO RESTLESS THAT IT IS HARD TO SIT STILL: NEARLY EVERY DAY
GAD7 TOTAL SCORE: 21
2. NOT BEING ABLE TO STOP OR CONTROL WORRYING: NEARLY EVERY DAY
7. FEELING AFRAID AS IF SOMETHING AWFUL MIGHT HAPPEN: NEARLY EVERY DAY
IF YOU CHECKED OFF ANY PROBLEMS ON THIS QUESTIONNAIRE, HOW DIFFICULT HAVE THESE PROBLEMS MADE IT FOR YOU TO DO YOUR WORK, TAKE CARE OF THINGS AT HOME, OR GET ALONG WITH OTHER PEOPLE: VERY DIFFICULT
GAD7 TOTAL SCORE: 21
4. TROUBLE RELAXING: NEARLY EVERY DAY
GAD7 TOTAL SCORE: 21
7. FEELING AFRAID AS IF SOMETHING AWFUL MIGHT HAPPEN: NEARLY EVERY DAY
1. FEELING NERVOUS, ANXIOUS, OR ON EDGE: NEARLY EVERY DAY
8. IF YOU CHECKED OFF ANY PROBLEMS, HOW DIFFICULT HAVE THESE MADE IT FOR YOU TO DO YOUR WORK, TAKE CARE OF THINGS AT HOME, OR GET ALONG WITH OTHER PEOPLE?: VERY DIFFICULT
6. BECOMING EASILY ANNOYED OR IRRITABLE: NEARLY EVERY DAY
3. WORRYING TOO MUCH ABOUT DIFFERENT THINGS: NEARLY EVERY DAY

## 2024-01-17 ASSESSMENT — ENCOUNTER SYMPTOMS
FREQUENCY: 0
SORE THROAT: 0
COUGH: 0
HEARTBURN: 0
NAUSEA: 0
PARESTHESIAS: 0
DYSURIA: 0
ABDOMINAL PAIN: 0
CONSTIPATION: 0
NERVOUS/ANXIOUS: 0
SHORTNESS OF BREATH: 0
WEAKNESS: 0
MYALGIAS: 0
HEMATURIA: 0
ARTHRALGIAS: 0
EYE PAIN: 0
HEADACHES: 0
PALPITATIONS: 0
HEMATOCHEZIA: 0
CHILLS: 0
FEVER: 0
DIZZINESS: 0
JOINT SWELLING: 0
DIARRHEA: 0

## 2024-01-17 ASSESSMENT — PAIN SCALES - GENERAL: PAINLEVEL: NO PAIN (0)

## 2024-01-17 ASSESSMENT — COLUMBIA-SUICIDE SEVERITY RATING SCALE - C-SSRS
6. HAVE YOU EVER DONE ANYTHING, STARTED TO DO ANYTHING, OR PREPARED TO DO ANYTHING TO END YOUR LIFE?: NO
1. WITHIN THE PAST MONTH, HAVE YOU WISHED YOU WERE DEAD OR WISHED YOU COULD GO TO SLEEP AND NOT WAKE UP?: YES
2. IN THE PAST MONTH, HAVE YOU ACTUALLY HAD ANY THOUGHTS OF KILLING YOURSELF?: NO

## 2024-01-17 ASSESSMENT — PATIENT HEALTH QUESTIONNAIRE - PHQ9
10. IF YOU CHECKED OFF ANY PROBLEMS, HOW DIFFICULT HAVE THESE PROBLEMS MADE IT FOR YOU TO DO YOUR WORK, TAKE CARE OF THINGS AT HOME, OR GET ALONG WITH OTHER PEOPLE: EXTREMELY DIFFICULT
SUM OF ALL RESPONSES TO PHQ QUESTIONS 1-9: 23
SUM OF ALL RESPONSES TO PHQ QUESTIONS 1-9: 23

## 2024-01-17 NOTE — NURSING NOTE
"Chief Complaint   Patient presents with    Physical    Establish Care       Initial There were no vitals taken for this visit. Estimated body mass index is 32.36 kg/m  as calculated from the following:    Height as of 11/4/23: 1.702 m (5' 7\").    Weight as of 11/4/23: 93.7 kg (206 lb 9.6 oz).    FOOD SECURITY SCREENING QUESTIONS:    The next two questions are to help us understand your food security.  If you are feeling you need any assistance in this area, we have resources available to support you today.    Hunger Vital Signs:  Within the past 12 months we worried whether our food would run out before we got money to buy more. Sometimes  Within the past 12 months the food we bought just didn't last and we didn't have money to get more. Sometimes    Medication Reconciliation: Complete.       Naida Patricio LPN on 1/17/2024 at 3:19 PM     "

## 2024-01-17 NOTE — PATIENT INSTRUCTIONS
Lab work in process:  Lab work on average will return in 1-2 hours, unless listed otherwise below (your provider will typically review & provide you with results and recommendations within 1 day):  - CBC - blood counts  - Iron levels  - CMP/BMP - kidney and electrolyte lab. CMP adds in liver function  - TSH - thyroid lab  - Vitamin - takes 1-5 days to return   - PAP alone - on average 1-3 weeks to return  Preventive Health Recommendations  Female Ages 21 to 25     Yearly exam:   See your health care provider every year in order to  Review health changes.   Discuss preventive care.    Review your medicines if your doctor has prescribed any.    You should be tested each year for STDs (sexually transmitted diseases).     Talk to your provider about how often you should have cholesterol testing.    Get a Pap test every three years. If you have an abnormal result, your doctor may have you test more often.    If you are at risk for diabetes, you should have a diabetes test (fasting glucose).     Shots:   Get a flu shot each year.   Get a tetanus shot every 10 years.   Consider getting the shot (vaccine) that prevents cervical cancer (Gardasil).    Nutrition:   Eat at least 5 servings of fruits and vegetables each day.  Eat whole-grain bread, whole-wheat pasta and brown rice instead of white grains and rice.  Get adequate Calcium and Vitamin D.     Lifestyle  Exercise at least 150 minutes a week each week (30 minutes a day, 5 days a week). This will help you control your weight and prevent disease.  Limit alcohol to one drink per day.  No smoking.   Wear sunscreen to prevent skin cancer.  See your dentist every six months for an exam and cleaning.
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
## 2024-01-17 NOTE — COMMUNITY RESOURCES LIST (ENGLISH)
01/17/2024   [facility]  N/A  For questions about this resource list or additional care needs, please contact your primary care clinic or care manager.  Phone: [phone]   Email: N/A   Address: [address] [place] [id number]   Hours: N/A        Food and Nutrition       Food pantry  1  [facility] Shel[facility] Distance: 6.8 miles      Pickup   [address]  [place] [address]  Language: English  Hours: Thu 10:00 AM - 1:00 PM  Fees: Free   Phone: (941) 330-7829 Email: janak@[employer] Website: https://www.[employer].com/[place]Food[place]helf/     2  [facility] Distance: 21.88 miles      In-Person   [address] [place] [address]  Language: English  Hours: Mon - Thu 11:00 AM - 3:30 PM  Fees: Free   Phone: (958) 409-1170 Email: info@[employer] Website: https://[employer]     SNAP application assistance  3  [facility] Distance: 20.49 miles      In-Person, Phone/Virtual   [address] [place] [address]  Language: English  Hours: Mon - Fri 8:00 AM - 4:30 PM  Fees: Free   Phone: (636) 934-5017 Website: https://www.[employer]/partner/hnflxvyth-iaovriwe-bpsaxclvtwr-[facility]     4  [facility] Distance: 20.49 miles      [address] [place] [address] [place] [address]  Language: English  Hours: Mon - Fri 8:00 AM - 4:30 PM  Fees: Free   Phone: (478) 710-3091 Website: https://www.Lists of [facility] in [facility] [place].mn./[facility]/Health-Human-Services          Transportation       Free or low-cost transportation  5  [facility] - [facility] - Free or Low-cost Transportation Distance: 20.49 miles      In-Person   [address] [facility] [place] [address]  Language: English  Hours: Mon - Fri 8:00 AM - 4:30 PM  Fees: Free   Phone: (547) 512-9357 Website:  https://www.[employer].i[employer]acs/partner/rmlysgifd-taquisrc-pfjyfyuxqkt-agency-aeoa-grand-rapids          Important Numbers & Websites       Emergency Services   911  City Services   311  Poison Control   (590) 901-7195  Suicide Prevention Lifeline   (419) 939-7342 (TALK)  Child Abuse Hotline   (445) 403-4321 (4-A-Child)  Sexual Assault Hotline   (546) 817-5465 (HOPE)  National Runaway Safeline   (709) 415-1103 (RUNAWAY)  All-Options Talkline   (233) 321-6920  Substance Abuse Referral   (128) 140-8418 (HELP)

## 2024-01-19 LAB
BKR LAB AP GYN ADEQUACY: NORMAL
BKR LAB AP GYN INTERPRETATION: NORMAL
BKR LAB AP HPV REFLEX: NO
BKR LAB AP LMP: NORMAL
BKR LAB AP PREVIOUS ABNORMAL: NORMAL
PATH REPORT.COMMENTS IMP SPEC: NORMAL
PATH REPORT.COMMENTS IMP SPEC: NORMAL
PATH REPORT.RELEVANT HX SPEC: NORMAL
VIT D+METAB SERPL-MCNC: 23 NG/ML (ref 20–50)

## 2024-02-14 ENCOUNTER — TELEPHONE (OUTPATIENT)
Dept: FAMILY MEDICINE | Facility: OTHER | Age: 24
End: 2024-02-14

## 2024-02-14 DIAGNOSIS — F32.A ANXIETY AND DEPRESSION: ICD-10-CM

## 2024-02-14 DIAGNOSIS — F41.9 ANXIETY AND DEPRESSION: ICD-10-CM

## 2024-02-14 DIAGNOSIS — Z91.199 NO-SHOW FOR APPOINTMENT: Primary | ICD-10-CM

## 2024-02-14 NOTE — TELEPHONE ENCOUNTER
Patient missed today's appointment and psych appointment due to transportation difficulties.  Patient reports additional financial stressors.    Care coordinator referral pending.      Nat May LPN 2/14/2024 4:23 PM

## 2024-02-15 ENCOUNTER — PATIENT OUTREACH (OUTPATIENT)
Dept: CARE COORDINATION | Facility: CLINIC | Age: 24
End: 2024-02-15
Payer: COMMERCIAL

## 2024-02-15 NOTE — PROGRESS NOTES
Clinic Care Coordination Contact    Patient was referred for care coordination by her PCP Ashley Tsang PA-C.     Spoke with patient over the phone and educated her about care coordination through [facility]. Patient agreed to be enrolled and followed by care coordination. Patient reports that her insurance lapsed but she has turned in the paperwork and is waiting to hear from [facility] that her [employer] insurance is active. She reports that she has a hard time finding transportation to her appointments. I provided patient with phone number for [facility] and explained that her [employer] insurance will most likely be able to cover the cost once it is active. Patient reported that she is interested in getting connected with mental health services as soon as her insurance is active. She reported that she will make a doctors appointment as soon as her insurance is re-instated. Patient reported no other immediate concerns. Patient has care coordination phone number if she has any questions or needs assistance.    Care coordination will reach out in 2 weeks to see if she has heard back from the [facility] about her insurance.     LITZY Alba on 2/15/2024 at 2:24 PM

## 2024-02-29 ENCOUNTER — PATIENT OUTREACH (OUTPATIENT)
Dept: CARE COORDINATION | Facility: CLINIC | Age: 24
End: 2024-02-29
Payer: COMMERCIAL

## 2024-02-29 NOTE — PROGRESS NOTES
Clinic Care Coordination Contact    Spoke with patient on the phone and she reported that she has not heard back from the county about [place] insurance. I consulted with financial advocates and learned that patients insurance is active. She has [place] now.     Care coordination will call patient tomorrow and update her about her insurance being active.     Chuck Rosas, LITZY on 2/29/2024 at 4:33 PM

## 2024-03-01 ENCOUNTER — PATIENT OUTREACH (OUTPATIENT)
Dept: CARE COORDINATION | Facility: CLINIC | Age: 24
End: 2024-03-01
Payer: COMMERCIAL

## 2024-03-01 NOTE — PATIENT INSTRUCTIONS
Patient was updated that her insurance is currently active. She stated she will reach out to [facility] on her own. Patient is agreeable to having care coordination call in 2 weeks.     LITZY Alba on 3/1/2024 at 5:30 PM

## 2024-03-13 ENCOUNTER — VIRTUAL VISIT (OUTPATIENT)
Dept: FAMILY MEDICINE | Facility: OTHER | Age: 24
End: 2024-03-13
Attending: PHYSICIAN ASSISTANT
Payer: COMMERCIAL

## 2024-03-13 ENCOUNTER — PATIENT OUTREACH (OUTPATIENT)
Dept: CARE COORDINATION | Facility: CLINIC | Age: 24
End: 2024-03-13

## 2024-03-13 DIAGNOSIS — F30.9 MANIA (H): ICD-10-CM

## 2024-03-13 DIAGNOSIS — G56.91 NEUROPATHY OF UPPER EXTREMITY, RIGHT: ICD-10-CM

## 2024-03-13 DIAGNOSIS — M79.601 CHRONIC PAIN OF RIGHT UPPER EXTREMITY: Primary | ICD-10-CM

## 2024-03-13 DIAGNOSIS — G89.29 CHRONIC PAIN OF RIGHT UPPER EXTREMITY: Primary | ICD-10-CM

## 2024-03-13 PROCEDURE — 99442 PR PHYSICIAN TELEPHONE EVALUATION 11-20 MIN: CPT | Mod: 93 | Performed by: PHYSICIAN ASSISTANT

## 2024-03-13 NOTE — PROGRESS NOTES
Clinic Care Coordination Contact    Spoke with patient about her transportation issues as she missed her appointment with Ashley Tsang this morning. Patient reported that she never reached out to [facility] [employer] to schedule the ride. I explained I would contact them this time to setup the transportation and we will work towards patient being able to setup transportation independently.     Called [facility] [employer] and they are able to transport patient to her appointment on 4/3 with Melonie Zambrano at 8:30am. They will pick patient up from her house at 7:30am. Patients insurance covers the cost of transportation. [facility] had no openings to get patient home. After updating patient about this she reported that she would be able to find a ride home in the afternoon and to schedule with [facility] for the ride to the clinic in the morning.     Care coordination will plan on reaching out to patient to remind her about transportation 1-2 days before appointment. Patient was encouraged to reach out to the care coordination cell phone if she is needing assistance. Patient is aware of 2-1-1 First Call for Help as a community resource that is available 24/7.     LITZY Alba on 3/13/2024 at 1:12 PM

## 2024-03-13 NOTE — Clinical Note
Som Shankar,   I talked to Suzy today - she did not show up for visit, I reached out to make sure everything was OK. She expressed concerns about transportation, I reiterated [facility] as an option, she wondering if you can touch base with her again about this. She is also very nervous about upcoming mental health appointment, due to previous bad experience in [place]. I told her that Melonie is great and told her the typical flow - appointment length, diagnostic assessment, etc., that will be taking place on 4/3/24, she was more receptive after this. 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 Johns for EMG testing.   My main concern is transport and compliance with attending medical appointments.   Thank you!  Ashley
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
## 2024-03-13 NOTE — PATIENT INSTRUCTIONS
Referral placed for EMG/nerve study testing to evaluate right upper extremity/right elbow.  You will be contacted to schedule.    I will reach out to social work in regards to concerns and transportation.    Keep appointment with mental health team on 4/3/2024.

## 2024-03-14 ENCOUNTER — PATIENT OUTREACH (OUTPATIENT)
Dept: CARE COORDINATION | Facility: CLINIC | Age: 24
End: 2024-03-14
Payer: COMMERCIAL

## 2024-03-14 NOTE — PROGRESS NOTES
Clinic Care Coordination Contact      Spoke with fellow Care Coordinator, Chuck Rosas, re: patient's needs today.  Writer will now offer Care Coordination with patient.     Plan: Ongoing Care Coordination to assess and offer assist with community resources/referrals; ongoing encouragement, support.  Will initiate contact with patient one week before her appointments to assist in getting transportation set up for her for medical/mental health [facility] appointments.     HARI ARMENDARIZ on 3/14/2024 at 4:31 PM

## 2024-04-02 ENCOUNTER — PATIENT OUTREACH (OUTPATIENT)
Dept: CARE COORDINATION | Facility: CLINIC | Age: 24
End: 2024-04-02
Payer: COMMERCIAL

## 2024-04-02 NOTE — PROGRESS NOTES
Clinic Care Coordination Contact    Initiated call to patient reminding her of appointment with Geovanna Zambrano/NP/psychiatry for tomorrow at 8:30am.    Patient answered call and writer reminded her of appointment.  She asked about [employer] [facility] and agreed to call them to verify  time.     Writer texted her [employer] phone # and encouraged her to call right away.     Writer also called on her behalf and left a voice message with [employer].     Plan: Ongoing Care Coordination to assist in prompts to follow through with her psychiatry appointments. Will set up a reminder within several days to one week to follow up re: her appointment for tomorrow.     HARI ARMENDARIZ on 4/2/2024 at 4:35 PM       Clinic Care Coordination Contact    Patient had attended her appointment with Melonie Zambrano/JOSE this week--4/3/24 and has made appointment for follow up on 5/13/24.     Plan: Writer will initiate a contact with patient prior to 5/13/24 appointment to offer assist with transportation/encouragement to follow through with appointment.     Reminder set up for 5/3/24.     HARI ARMENDARIZ on 4/5/2024 at 5:38 PM

## 2024-04-03 ENCOUNTER — OFFICE VISIT (OUTPATIENT)
Dept: PSYCHIATRY | Facility: OTHER | Age: 24
End: 2024-04-03
Payer: COMMERCIAL

## 2024-04-03 VITALS
SYSTOLIC BLOOD PRESSURE: 120 MMHG | TEMPERATURE: 97.9 F | HEART RATE: 81 BPM | OXYGEN SATURATION: 98 % | HEIGHT: 68 IN | DIASTOLIC BLOOD PRESSURE: 83 MMHG | RESPIRATION RATE: 18 BRPM | BODY MASS INDEX: 33.83 KG/M2 | WEIGHT: 223.2 LBS

## 2024-04-03 DIAGNOSIS — F90.9 ATTENTION DEFICIT HYPERACTIVITY DISORDER (ADHD), UNSPECIFIED ADHD TYPE: ICD-10-CM

## 2024-04-03 DIAGNOSIS — F39 MOOD DISORDER (H): Primary | ICD-10-CM

## 2024-04-03 DIAGNOSIS — F43.10 PTSD (POST-TRAUMATIC STRESS DISORDER): ICD-10-CM

## 2024-04-03 PROCEDURE — G0463 HOSPITAL OUTPT CLINIC VISIT: HCPCS

## 2024-04-03 PROCEDURE — G2211 COMPLEX E/M VISIT ADD ON: HCPCS

## 2024-04-03 PROCEDURE — 99417 PROLNG OP E/M EACH 15 MIN: CPT

## 2024-04-03 PROCEDURE — 99205 OFFICE O/P NEW HI 60 MIN: CPT

## 2024-04-03 RX ORDER — LURASIDONE HYDROCHLORIDE 20 MG/1
TABLET, FILM COATED ORAL
Qty: 30 TABLET | Refills: 1 | Status: SHIPPED | OUTPATIENT
Start: 2024-04-03 | End: 2024-05-13

## 2024-04-03 RX ORDER — LISDEXAMFETAMINE DIMESYLATE 10 MG/1
10 CAPSULE ORAL EVERY MORNING
Qty: 30 CAPSULE | Refills: 0 | Status: SHIPPED | OUTPATIENT
Start: 2024-04-03 | End: 2024-05-13

## 2024-04-03 RX ORDER — PROPRANOLOL HYDROCHLORIDE 10 MG/1
10 TABLET ORAL 3 TIMES DAILY PRN
Qty: 90 TABLET | Refills: 0 | Status: SHIPPED | OUTPATIENT
Start: 2024-04-03

## 2024-04-03 ASSESSMENT — PATIENT HEALTH QUESTIONNAIRE - PHQ9
SUM OF ALL RESPONSES TO PHQ QUESTIONS 1-9: 22
10. IF YOU CHECKED OFF ANY PROBLEMS, HOW DIFFICULT HAVE THESE PROBLEMS MADE IT FOR YOU TO DO YOUR WORK, TAKE CARE OF THINGS AT HOME, OR GET ALONG WITH OTHER PEOPLE: EXTREMELY DIFFICULT
SUM OF ALL RESPONSES TO PHQ QUESTIONS 1-9: 22

## 2024-04-03 ASSESSMENT — ANXIETY QUESTIONNAIRES
3. WORRYING TOO MUCH ABOUT DIFFERENT THINGS: NEARLY EVERY DAY
6. BECOMING EASILY ANNOYED OR IRRITABLE: NEARLY EVERY DAY
2. NOT BEING ABLE TO STOP OR CONTROL WORRYING: NEARLY EVERY DAY
7. FEELING AFRAID AS IF SOMETHING AWFUL MIGHT HAPPEN: NEARLY EVERY DAY
7. FEELING AFRAID AS IF SOMETHING AWFUL MIGHT HAPPEN: NEARLY EVERY DAY
GAD7 TOTAL SCORE: 21
GAD7 TOTAL SCORE: 21
4. TROUBLE RELAXING: NEARLY EVERY DAY
IF YOU CHECKED OFF ANY PROBLEMS ON THIS QUESTIONNAIRE, HOW DIFFICULT HAVE THESE PROBLEMS MADE IT FOR YOU TO DO YOUR WORK, TAKE CARE OF THINGS AT HOME, OR GET ALONG WITH OTHER PEOPLE: SOMEWHAT DIFFICULT
8. IF YOU CHECKED OFF ANY PROBLEMS, HOW DIFFICULT HAVE THESE MADE IT FOR YOU TO DO YOUR WORK, TAKE CARE OF THINGS AT HOME, OR GET ALONG WITH OTHER PEOPLE?: SOMEWHAT DIFFICULT
GAD7 TOTAL SCORE: 21
1. FEELING NERVOUS, ANXIOUS, OR ON EDGE: NEARLY EVERY DAY
5. BEING SO RESTLESS THAT IT IS HARD TO SIT STILL: NEARLY EVERY DAY

## 2024-04-03 ASSESSMENT — PAIN SCALES - GENERAL: PAINLEVEL: NO PAIN (0)

## 2024-04-03 NOTE — COMMUNITY RESOURCES LIST (ENGLISH)
April 3, 2024           YOUR PERSONALIZED LIST OF SERVICES & PROGRAMS           NAVIGATION    Eligibility Screening      [facility]  [address] [place] [address] (Distance: 18.5 miles)  Phone: [phone]  Website: [email or web address]  Language: English  Fee: Free  Accessibility: [place] accessible      [facility] benefits application assistance  [address] [place] [address] (Distance: 20.2 miles)  Phone: [phone]) [phone]  Language: English  Fee: Free      [employer]  Phone: (432) 233-9230  Website: https://www.[facility].org/about-us/assister-program/navigators/index.jsp  Language: English  Hours: Mon 8:00 AM - 4:00 PM Tue 8:00 AM - 4:00 PM Wed 8:00 AM - 4:00 PM Thu 8:00 AM - 4:00 PM        ASSISTANCE    Nutrition Benefits      [employer] - SNAP application assistance  [address] [place] [address] (Distance: 20.5 miles)  Language: English  Fee: Free      [employer] [place] - SNAP (formerly food stamps) Screening and Application help  Phone: (869) 205-9182  Website: https://www.hungersolutions.org/programs/[place]-food-helpline/  Language: English  Hours: Mon 10:00 AM - 5:00 PM Tue 10:00 AM - 5:00 PM Wed 10:00 AM - 5:00 PM Thu 10:00 AM - 5:00 PM Fri 10:00 AM - 5:00 PM  Fee: Free  Accessibility: [place] accessible, Blind accommodation, Deaf or hard of hearing, Translation services    Pantry      [facility] - Food pantry  [address] [place] [address] (Distance: 21.9 miles)  Phone: (866) 596-4231  Website: https://K[employer] Warner  Language: English  Fee: Free  Accessibility: Translation services, [place] accessible      [facility] [facility] [employer] [facility] - Food pantry  [address] [place] [address] (Distance: 6.8 miles)  Phone: (261) 664-1699  Website: https://www.TouristEye.[place]kill/[employer]Shelf/  Language: English  Fee: Free      [employer]  Phone: (044)  316-3352  Website: https://www.Traxer.[employer]-food-bank  Language: English  Hours: Mon 9:00 AM - 5:00 PM Tue 9:00 AM - 5:00 PM Wed 9:00 AM - 5:00 PM Thu 9:00 AM - 5:00 PM Fri 9:00 AM - 5:00 PM  Fee: Free               IMPORTANT NUMBERS & WEBSITES        Emergency Services  911  .   [facility]  211 http://211unitedway.org  .   Poison Control  (350) 586-1853 http://[place]poison.org http://[place]poison.org  .     Suicide and Crisis Lifeline  988 http://988Spaciousline.[employer] Communications  .   Childhelp [place] Child Abuse Hotline  326.672.1793 http://Childhelphotline.org   .   [place] Sexual Assault Hotline  (490) 702-8199 (HOPE) http://ffk environment.[employer] Communications   .     [place] Runaway Safeline  (934) 871-8242 (RUNAWAY) http://ConnectNig[place].com.[employer] Communications  .   Pregnancy & Postpartum Support  Call/text 849-996-0744  [place]: http://ppsupport[place].org  [place]: http://AppTap.com/wi  .   Substance Abuse National Helpline ([facility])  272-037-HELP (0789) http://Findtreatment.gov   .                DISCLAIMER: These resources have been generated via the Cloud[employer] Platform. [employer] does not endorse any service providers mentioned in this resource list. [employer] does not guarantee that the services mentioned in this resource list will be available to you or will improve your health or wellness.    [facility]
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
## 2024-04-03 NOTE — NURSING NOTE
"Chief Complaint   Patient presents with    Consult     Discuss medications       Initial /83   Pulse 81   Temp 97.9  F (36.6  C) (Tympanic)   Resp 18   Ht 1.721 m (5' 7.75\")   Wt 101.2 kg (223 lb 3.2 oz)   LMP 03/22/2024 (Approximate)   SpO2 98%   BMI 34.19 kg/m   Estimated body mass index is 34.19 kg/m  as calculated from the following:    Height as of this encounter: 1.721 m (5' 7.75\").    Weight as of this encounter: 101.2 kg (223 lb 3.2 oz).  Medication Review: complete    The next two questions are to help us understand your food security.  If you are feeling you need any assistance in this area, we have resources available to support you today.          1/17/2024   SDOH- Food Insecurity   Within the past 12 months, did you worry that your food would run out before you got money to buy more? Y   Within the past 12 months, did the food you bought just not last and you didn t have money to get more? Y         Health Care Directive:  Patient does not have a Health Care Directive or Living Will: Discussed advance care planning with patient; however, patient declined at this time.    Alfreda Short      "

## 2024-04-03 NOTE — PATIENT INSTRUCTIONS
"Suzy, it was a pleasure seeing you today. As we discussed:    START Vyvanse 10 mg daily at the start of your day for ADHD.  START Latuda - take 10 mg (1/2 tab) for 1 week, then increase to 20 mg (1 full tab) at bedtime with food. This will be to help your mood.  START propranolol 10 mg as needed up to 3 times daily for anxiety. Encourage you to at least take this before going to bed to help with sleep/nightmares.      Call the clinic with medication questions or concerns at [phone] or send a Ziplocal message. Mindscapet may be used to communicate with your provider, but this is not intended to be used for emergencies.     Mobile Crisis Line for [facility]: 211 (First Call for Help)    Therapy Options in [place] and Surrounding Area:    Eduin Davidson, [facility] - ([phone])    Psychological Services - Clint Prasad ([phone])    Allie Olmos, [facility], family and individual therapy- ([phone])    [facility] - specializes in women and adolescent therapy - ([phone])    [facility] - EMDR, trauma, etc. ([phone])    [facility] - spiritual based support groups ([phone])    Matthew Gamble - adults, adolescents and children ([phone])    [facility] - several different therapy options adults and children ([phone])    [facility] - several options, one of the largest mental health providers in the area - ([phone])    [facility] - ([phone])    [facility] - offers several therapy options including 8-week \"express\" therapy program - ([phone])    [facility] - ([phone])    [facility] - adult therapy ([phone])    [facility] - ([phone])    [facility] - ([phone])    [facility] - ([phone])    [facility] - ([phone])   "

## 2024-05-13 ENCOUNTER — TELEPHONE (OUTPATIENT)
Dept: PSYCHIATRY | Facility: OTHER | Age: 24
End: 2024-05-13
Payer: COMMERCIAL

## 2024-05-13 DIAGNOSIS — F90.9 ATTENTION DEFICIT HYPERACTIVITY DISORDER (ADHD), UNSPECIFIED ADHD TYPE: ICD-10-CM

## 2024-05-13 DIAGNOSIS — F43.10 PTSD (POST-TRAUMATIC STRESS DISORDER): ICD-10-CM

## 2024-05-13 DIAGNOSIS — F39 MOOD DISORDER (H): ICD-10-CM

## 2024-05-13 NOTE — TELEPHONE ENCOUNTER
This should be run past mental health team as they are managing medications to see if they are willing to refills or sooner appointment - Vyvanse is a controlled medication - I have never prescribed these for her before.     Ashley Tsang PA-C  5/13/2024  5:31 PM

## 2024-05-13 NOTE — TELEPHONE ENCOUNTER
Patient would like to  today. Patient has been out since 5/3/24.       Bethany Clayton on 5/13/2024 at 2:39 PM

## 2024-05-13 NOTE — TELEPHONE ENCOUNTER
Good Morning    Patient appoinment needed to be canceled and rescheduled with Melonie, she is needing refill of vyvanse and latuda

## 2024-05-14 RX ORDER — LISDEXAMFETAMINE DIMESYLATE 10 MG/1
10 CAPSULE ORAL EVERY MORNING
Qty: 30 CAPSULE | Refills: 0 | Status: SHIPPED | OUTPATIENT
Start: 2024-05-14

## 2024-05-14 RX ORDER — LURASIDONE HYDROCHLORIDE 20 MG/1
TABLET, FILM COATED ORAL
Qty: 30 TABLET | Refills: 0 | Status: SHIPPED | OUTPATIENT
Start: 2024-05-14 | End: 2024-06-18

## 2024-05-14 NOTE — TELEPHONE ENCOUNTER
Medications filled and sent to [facility] pharmacy - please let her know this and that I will see her 6/14!     AZEB Samson CNP on 5/14/2024 at 7:46 AM

## 2024-05-14 NOTE — TELEPHONE ENCOUNTER
After verifying patient's name and date of birth, patient was given the below information. Also sent her a link to Lorena Gaxiola.  Norma J. Gosselin, LPN....5/14/2024 9:44 AM

## 2024-05-21 ENCOUNTER — PATIENT OUTREACH (OUTPATIENT)
Dept: CARE COORDINATION | Facility: CLINIC | Age: 24
End: 2024-05-21
Payer: COMMERCIAL

## 2024-05-21 ENCOUNTER — TELEPHONE (OUTPATIENT)
Dept: FAMILY MEDICINE | Facility: OTHER | Age: 24
End: 2024-05-21
Payer: COMMERCIAL

## 2024-05-21 NOTE — PROGRESS NOTES
"Clinic Care Coordination Contact    Called patient and spoke to her on the phone. She reported that her anxiety is extremely high currently due to a \"high-risk pregnancy scare\". She reports that she needs to see her primary care provider Ashley Tsang as soon as possible.      I checked with scheduling and Ashley Tsang's schedule is currently full. I informed patient that she could call early tomorrow morning around 7:30am and try to get a same day appointment. I also let her know that she could go to [facility] to speak with a provider about her concerns.     Care coordination will continue to follow.     LITZY Alba on 5/21/2024 at 3:23 PM    "

## 2024-05-23 NOTE — TELEPHONE ENCOUNTER
Left message for patient to call back. Nat Elizondo LPN .......5/21/2024 3:46 PM    
Patient states someone already followed up with her, everything is good  Vandana Benoit LPN.......5/23/2024 8:05 AM    
Patient would not say what she needed other than she wanted a call.    Radha Gillespie on 5/21/2024 at 12:15 PM    
Alert/Awake

## 2024-06-18 DIAGNOSIS — F39 MOOD DISORDER (H): ICD-10-CM

## 2024-06-18 DIAGNOSIS — F90.9 ATTENTION DEFICIT HYPERACTIVITY DISORDER (ADHD), UNSPECIFIED ADHD TYPE: ICD-10-CM

## 2024-06-18 DIAGNOSIS — F43.10 PTSD (POST-TRAUMATIC STRESS DISORDER): ICD-10-CM

## 2024-06-18 RX ORDER — LISDEXAMFETAMINE DIMESYLATE 10 MG/1
10 CAPSULE ORAL EVERY MORNING
Qty: 30 CAPSULE | Refills: 0 | OUTPATIENT
Start: 2024-06-18

## 2024-06-18 RX ORDER — LURASIDONE HYDROCHLORIDE 20 MG/1
TABLET, FILM COATED ORAL
Qty: 30 TABLET | Refills: 0 | Status: SHIPPED | OUTPATIENT
Start: 2024-06-18

## 2024-06-18 NOTE — TELEPHONE ENCOUNTER
Requested Prescriptions   Pending Prescriptions Disp Refills    lisdexamfetamine (VYVANSE) 10 MG capsule 30 capsule 0     Sig: Take 1 capsule (10 mg) by mouth every morning   Last Prescription Date:   5/14/24  Last Fill Qty/Refills:         30, R-0      There is no refill protocol information for this order       lurasidone (LATUDA) 20 MG TABS tablet 30 tablet 0     Sig: Take 1/2 tab for 1 week, then increase to full tablet daily with food.   Last Prescription Date:   5/14/24  Last Fill Qty/Refills:         30, R-0      There is no refill protocol information for this order        Last Office Visit:              4/3/24   Future Office visit:           None    Per LOV note:  F/U: 1 month or sooner if symptoms occur.     5/13- provider was unavailable; appointment cancelled.  6/14-Pt was no show to appointment    Pt due for follow up appointment. Routing to provider for refill consideration. Routing to Unit scheduling pool, to assist Pt in scheduling appointment.     Unable to complete prescription refill per RN Medication Refill Policy.     Suma Morse RN .............. 6/18/2024  11:31 AM

## 2024-06-18 NOTE — TELEPHONE ENCOUNTER
Reason for call: Medication or medication refill    Have you contacted your pharmacy regarding this refill? Yes    If No, direct the patient to contact the Pharmacy and discontinue telephone note using Erroneous Encounter.  If Yes, continue.    Name of medication requested: Vyvanse 10mg cap and Latuda 20mg tab    How many days of medication do you have left? 0    What pharmacy do you use? [facility] in [place]    Preferred method for responding to this message: Telephone Call    Phone number patient can be reached at: Cell number on file:    Telephone Information:   Mobile [phone]       If we cannot reach you directly, may we leave a detailed response at the number you provided? Yes    Radha Gillespie on 6/18/2024 at 11:18 AM

## 2024-08-14 ENCOUNTER — PATIENT OUTREACH (OUTPATIENT)
Dept: CARE COORDINATION | Facility: CLINIC | Age: 24
End: 2024-08-14
Payer: COMMERCIAL

## 2024-08-14 NOTE — PROGRESS NOTES
Clinic Care Coordination Contact    Patient has no showed for her last two psychiatry appointments.     Attempted to call patient, no answer. Unable to leave voicemail at this time.     Care coordination will attempt to reach patient at a later time.     LITZY Alba on 8/14/2024 at 11:18 AM

## 2024-08-27 ENCOUNTER — OFFICE VISIT (OUTPATIENT)
Dept: FAMILY MEDICINE | Facility: OTHER | Age: 24
End: 2024-08-27
Payer: COMMERCIAL

## 2024-08-27 VITALS
TEMPERATURE: 98.3 F | HEART RATE: 104 BPM | RESPIRATION RATE: 19 BRPM | DIASTOLIC BLOOD PRESSURE: 78 MMHG | HEIGHT: 67 IN | WEIGHT: 223.6 LBS | SYSTOLIC BLOOD PRESSURE: 125 MMHG | BODY MASS INDEX: 35.09 KG/M2 | OXYGEN SATURATION: 99 %

## 2024-08-27 DIAGNOSIS — J02.9 SORE THROAT: Primary | ICD-10-CM

## 2024-08-27 LAB — GROUP A STREP BY PCR: NOT DETECTED

## 2024-08-27 PROCEDURE — 99213 OFFICE O/P EST LOW 20 MIN: CPT | Performed by: PHYSICIAN ASSISTANT

## 2024-08-27 PROCEDURE — 87651 STREP A DNA AMP PROBE: CPT | Mod: ZL | Performed by: PHYSICIAN ASSISTANT

## 2024-08-27 PROCEDURE — G0463 HOSPITAL OUTPT CLINIC VISIT: HCPCS

## 2024-08-27 ASSESSMENT — PATIENT HEALTH QUESTIONNAIRE - PHQ9: SUM OF ALL RESPONSES TO PHQ QUESTIONS 1-9: 0

## 2024-08-27 ASSESSMENT — PAIN SCALES - GENERAL: PAINLEVEL: NO PAIN (0)

## 2024-08-27 NOTE — NURSING NOTE
"Chief Complaint   Patient presents with    Pharyngitis     X2 days     Patient here for a sore throat, white patches, and uvula is swollen x2 days.     Initial /78   Pulse 104   Temp 98.3  F (36.8  C) (Tympanic)   Resp 19   Ht 1.702 m (5' 7\")   Wt 101.4 kg (223 lb 9.6 oz)   LMP 07/30/2024 (Approximate)   SpO2 99%   BMI 35.02 kg/m   Estimated body mass index is 35.02 kg/m  as calculated from the following:    Height as of this encounter: 1.702 m (5' 7\").    Weight as of this encounter: 101.4 kg (223 lb 9.6 oz).  Medication Review: complete    The next two questions are to help us understand your food security.  If you are feeling you need any assistance in this area, we have resources available to support you today.          8/27/2024   SDOH- Food Insecurity   Within the past 12 months, did you worry that your food would run out before you got money to buy more? N   Within the past 12 months, did the food you bought just not last and you didn t have money to get more? N            Health Care Directive:  Patient does not have a Health Care Directive or Living Will: Discussed advance care planning with patient; however, patient declined at this time.    Rozian Jj LPN      "

## 2024-08-27 NOTE — PROGRESS NOTES
ASSESSMENT/PLAN:    I have reviewed the nursing notes.  I have reviewed the findings, diagnosis, plan and need for follow up with the patient.           *** Discussed with patient that symptoms and exam are consistent with viral illness.  Discussed that symptomatic treatment of cough is appropriate but not with antibiotics.      - Symptomatic treatment - Encouraged fluids, salt water gargles, honey (only if greater than 1 year in age due to risk of botulism), elevation, humidifier, sinus rinse/netti pot, lozenges, tea, topical vapor rub, popsicles, rest, etc     - May use over-the-counter Tylenol or ibuprofen as needed for pain, inflammation or fever    - Discussed warning signs/symptoms indicative of need to f/u    - Follow up if symptoms persist or worsen or concerns    - I explained my diagnostic considerations and recommendations to the patient, who voiced understanding and agreement with the treatment plan. All questions were answered. We discussed potential side effects of any prescribed or recommended therapies, as well as expectations for response to treatments.    AZEB Santos CNP  2024  11:23 AM    HPI:    Suzy Miles is a 24 year old female who presents to [facility] today for concerns of possible strep throat.     Past Medical History:   Diagnosis Date    Anxiety     Depression     Ovarian cyst      Past Surgical History:   Procedure Laterality Date     SECTION N/A 2020    Procedure:  SECTION;  Surgeon: Eva Ospina MD;  Location:  OR    NO HISTORY OF SURGERY       Social History     Tobacco Use    Smoking status: Some Days     Current packs/day: 0.00     Types: Cigarettes     Last attempt to quit: 11/15/2019     Years since quittin.7     Passive exposure: Never    Smokeless tobacco: Never   Substance Use Topics    Alcohol use: Yes     Comment: will drink whole bottle of vodka through out the whole day with beer also about 1-2 times a week     Current  Outpatient Medications   Medication Sig Dispense Refill    cholecalciferol (VITAMIN D3) 125 MCG (5000 UT) TABS tablet Take by mouth daily      lisdexamfetamine (VYVANSE) 10 MG capsule Take 1 capsule (10 mg) by mouth every morning 30 capsule 0    lurasidone (LATUDA) 20 MG TABS tablet Take 1/2 tab for 1 week, then increase to full tablet daily with food. 30 tablet 0    multivitamin w/minerals (MULTIVITAMINS W/MINERALS) tablet Take 1 tablet by mouth daily      propranolol (INDERAL) 10 MG tablet Take 1 tablet (10 mg) by mouth 3 times daily as needed (for anxiety) 90 tablet 0    valACYclovir (VALTREX) 1000 mg tablet Take 1 tablet (1,000 mg) by mouth 2 times daily (Patient not taking: Reported on 4/3/2024) 20 tablet 0     No Known Allergies  Past medical history, past surgical history, current medications and allergies reviewed and accurate to the best of my knowledge.      ROS:  Refer to HPI    There were no vitals taken for this visit.    EXAM:  General Appearance: Well appearing 24 year old female, appropriate appearance for age. No acute distress   Ears: Left TM intact, translucent with bony landmarks appreciated, no erythema, no effusion, no bulging, no purulence.  Right TM intact, translucent with bony landmarks appreciated, no erythema, no effusion, no bulging, no purulence.  Left auditory canal clear.  Right auditory canal clear.  Normal external ears, non tender.  Eyes: conjunctivae normal without erythema or irritation, corneas clear, no drainage or crusting, no eyelid swelling, pupils equal   Oropharynx: moist mucous membranes, posterior pharynx {w-w/o:5700} erythema, tonsils {ENT TONSILS:680693}, no erythema, no exudates or petechiae, no post nasal drip seen, no trismus, voice clear.    Sinuses:  No sinus tenderness upon palpation of the frontal or maxillary sinuses  Nose:  Bilateral nares: no erythema, no edema, no drainage or congestion   Neck: supple without adenopathy  Respiratory: normal chest wall and  respirations.  Normal effort.  Clear to auscultation bilaterally, no wheezing, crackles or rhonchi.  No increased work of breathing.  No cough appreciated.  Cardiac: RRR with no murmurs  Musculoskeletal:  Equal movement of bilateral upper extremities.  Equal movement of bilateral lower extremities.  Normal gait.    Neuro: Alert and oriented to person, place, and time.    Psychological: normal affect, alert, oriented, and pleasant.     Labs:  ***

## 2024-08-27 NOTE — PROGRESS NOTES
"ASSESSMENT/PLAN:     I have reviewed the nursing notes.  I have reviewed the findings, diagnosis, plan and need for follow up with the patient.    Bronwyn presents to clinic today for evaluation of sore throat.  Onset 2 days ago with progression.  Afebrile.  Vital signs stable.  Strep test is negative.  Symptomatic treatments for viral illness.  Return to clinic if symptoms persist or worsen.    1. Sore throat  - Group A Streptococcus PCR Throat Swab           I explained my diagnostic considerations and recommendations to the patient, who voiced understanding and agreement with the treatment plan. All questions were answered. We discussed potential side effects of any prescribed or recommended therapies, as well as expectations for response to treatments.    Rayne Oneil PA-C  [facility]          Nursing Notes:   Rozina Jj LPN  8/27/2024 11:39 AM  Signed  Chief Complaint   Patient presents with    Pharyngitis     X2 days     Patient here for a sore throat, white patches, and uvula is swollen x2 days.     Initial /78   Pulse 104   Temp 98.3  F (36.8  C) (Tympanic)   Resp 19   Ht 1.702 m (5' 7\")   Wt 101.4 kg (223 lb 9.6 oz)   LMP 07/30/2024 (Approximate)   SpO2 99%   BMI 35.02 kg/m   Estimated body mass index is 35.02 kg/m  as calculated from the following:    Height as of this encounter: 1.702 m (5' 7\").    Weight as of this encounter: 101.4 kg (223 lb 9.6 oz).  Medication Review: complete    The next two questions are to help us understand your food security.  If you are feeling you need any assistance in this area, we have resources available to support you today.          8/27/2024   SDOH- Food Insecurity   Within the past 12 months, did you worry that your food would run out before you got money to buy more? N   Within the past 12 months, did the food you bought just not last and you didn t have money to get more? N            Health Care Directive:  Patient does not " have a Health Care Directive or Living Will: Discussed advance care planning with patient; however, patient declined at this time.    Rozina Jj LPN           SUBJECTIVE:   Suzy Miles is a 24 year old female who presents to clinic today for evaluation of sore throat, swollen tonsils, tonsil stones.  Onset: 2 days ago  Course worsening  Associated symptoms sore throat and headache, tonsil stones  Treatments: She did remove some stones, no other treatments  History of recurrent tonsillitis in , patient did go to ENT and she did not meet criteria to remove her tonsils at that time        Past Medical History:   Diagnosis Date    Anxiety     Depression     Ovarian cyst      Past Surgical History:   Procedure Laterality Date     SECTION N/A 2020    Procedure:  SECTION;  Surgeon: Eva Ospina MD;  Location:  OR    NO HISTORY OF SURGERY       Social History     Tobacco Use    Smoking status: Some Days     Current packs/day: 0.00     Types: Cigarettes     Last attempt to quit: 11/15/2019     Years since quittin.7     Passive exposure: Never    Smokeless tobacco: Never   Substance Use Topics    Alcohol use: Yes     Comment: will drink whole bottle of vodka through out the whole day with beer also about 1-2 times a week     Current Outpatient Medications   Medication Sig Dispense Refill    cholecalciferol (VITAMIN D3) 125 MCG (5000 UT) TABS tablet Take by mouth daily (Patient not taking: Reported on 2024)      lisdexamfetamine (VYVANSE) 10 MG capsule Take 1 capsule (10 mg) by mouth every morning (Patient not taking: Reported on 2024) 30 capsule 0    lurasidone (LATUDA) 20 MG TABS tablet Take 1/2 tab for 1 week, then increase to full tablet daily with food. (Patient not taking: Reported on 2024) 30 tablet 0    multivitamin w/minerals (MULTIVITAMINS W/MINERALS) tablet Take 1 tablet by mouth daily (Patient not taking: Reported on 2024)      propranolol  "(INDERAL) 10 MG tablet Take 1 tablet (10 mg) by mouth 3 times daily as needed (for anxiety) (Patient not taking: Reported on 8/27/2024) 90 tablet 0    valACYclovir (VALTREX) 1000 mg tablet Take 1 tablet (1,000 mg) by mouth 2 times daily (Patient not taking: Reported on 4/3/2024) 20 tablet 0     No Known Allergies      Past medical history, past surgical history, current medications and allergies reviewed and accurate to the best of my knowledge.          OBJECTIVE:     /78   Pulse 104   Temp 98.3  F (36.8  C) (Tympanic)   Resp 19   Ht 1.702 m (5' 7\")   Wt 101.4 kg (223 lb 9.6 oz)   LMP 07/30/2024 (Approximate)   SpO2 99%   BMI 35.02 kg/m    Body mass index is 35.02 kg/m .  Physical Exam    General Appearance: Well appearing female, mild distress  Eyes: no injection, no tearing or drainage, eye lids normal  Ears: Canals and TM's normal   Orophayrnx: moist mucous membranes,   Throat: erythema, tonsils 2+, tonsil stones on left  Nose:  No sinus tenderness  Neck: supple with bilateral cervical adenopathy  Respiratory: normal respiration, no increased work of breathing Lungs Clear to auscultation  Cardiac: RRR with no murmurs  Psychological: normal affect, alert, oriented, and pleasant.           Results for orders placed or performed in visit on 08/27/24   Group A Streptococcus PCR Throat Swab     Status: Normal    Specimen: Throat; Swab   Result Value Ref Range    Group A strep by PCR Not Detected Not Detected    Narrative    The Xpert Xpress Strep A test, performed on the [employer], is a rapid, qualitative in vitro diagnostic test for the detection of Streptococcus pyogenes (Group A ß-hemolytic Streptococcus, Strep A) in throat swab specimens from patients with signs and symptoms of pharyngitis. The Xpert Xpress Strep A test can be used as an aid in the diagnosis of Group A Streptococcal pharyngitis. The assay is not intended to monitor treatment for Group A Streptococcus infections. " The Xpert Xpress Strep A test utilizes an automated real-time polymerase chain reaction (PCR) to detect Streptococcus pyogenes DNA.

## 2024-08-27 NOTE — PATIENT INSTRUCTIONS
Sore throat, strep test pending    Symptomatic treatments:  Warm fluids, cold soft foods, salt water gargles, humidified air. OTC throat sprays or throat lozenges as needed  Cepacol and chloraseptic - for throat  Ibuprofen or tylenol as needed for discomfort or fever  Return to clinic if symptoms persist or worsen  If symptoms persist past 7 days you could return to the clinic for a mono screen - this is a viral illness

## 2024-10-23 ENCOUNTER — PATIENT OUTREACH (OUTPATIENT)
Dept: CARE COORDINATION | Facility: CLINIC | Age: 24
End: 2024-10-23
Payer: COMMERCIAL
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
## 2024-11-10 ENCOUNTER — APPOINTMENT (OUTPATIENT)
Dept: CT IMAGING | Facility: OTHER | Age: 24
End: 2024-11-10
Attending: STUDENT IN AN ORGANIZED HEALTH CARE EDUCATION/TRAINING PROGRAM
Payer: COMMERCIAL

## 2024-11-10 ENCOUNTER — HOSPITAL ENCOUNTER (EMERGENCY)
Facility: OTHER | Age: 24
Discharge: HOME | End: 2024-11-10
Attending: STUDENT IN AN ORGANIZED HEALTH CARE EDUCATION/TRAINING PROGRAM
Payer: COMMERCIAL

## 2024-11-10 VITALS
WEIGHT: 223 LBS | TEMPERATURE: 99.3 F | HEART RATE: 104 BPM | SYSTOLIC BLOOD PRESSURE: 135 MMHG | OXYGEN SATURATION: 98 % | RESPIRATION RATE: 20 BRPM | HEIGHT: 67 IN | BODY MASS INDEX: 35 KG/M2 | DIASTOLIC BLOOD PRESSURE: 82 MMHG

## 2024-11-10 DIAGNOSIS — H11.32 SUBCONJUNCTIVAL HEMORRHAGE OF LEFT EYE: ICD-10-CM
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
## 2024-11-10 LAB
ALBUMIN UR-MCNC: 10 MG/DL
APPEARANCE UR: CLEAR
BACTERIA #/AREA URNS HPF: ABNORMAL /HPF
BILIRUB UR QL STRIP: NEGATIVE
COLOR UR AUTO: YELLOW
GLUCOSE UR STRIP-MCNC: NEGATIVE MG/DL
HGB UR QL STRIP: NEGATIVE
HYALINE CASTS: 1 /LPF
KETONES UR STRIP-MCNC: NEGATIVE MG/DL
LEUKOCYTE ESTERASE UR QL STRIP: NEGATIVE
MUCOUS THREADS #/AREA URNS LPF: PRESENT /LPF
NITRATE UR QL: NEGATIVE
PH UR STRIP: 7 [PH] (ref 5–9)
RBC URINE: 2 /HPF
SP GR UR STRIP: 1.03 (ref 1–1.03)
SQUAMOUS EPITHELIAL: 5 /HPF
UROBILINOGEN UR STRIP-MCNC: 4 MG/DL
WBC URINE: 2 /HPF

## 2024-11-10 PROCEDURE — 81001 URINALYSIS AUTO W/SCOPE: CPT | Performed by: STUDENT IN AN ORGANIZED HEALTH CARE EDUCATION/TRAINING PROGRAM

## 2024-11-10 PROCEDURE — 70486 CT MAXILLOFACIAL W/O DYE: CPT

## 2024-11-10 PROCEDURE — 99284 EMERGENCY DEPT VISIT MOD MDM: CPT | Mod: 25

## 2024-11-10 PROCEDURE — 250N000013 HC RX MED GY IP 250 OP 250 PS 637: Performed by: STUDENT IN AN ORGANIZED HEALTH CARE EDUCATION/TRAINING PROGRAM

## 2024-11-10 RX ORDER — OXYCODONE HYDROCHLORIDE 5 MG/1
10 TABLET ORAL ONCE
Status: COMPLETED | OUTPATIENT
Start: 2024-11-10 | End: 2024-11-10

## 2024-11-10 RX ORDER — OXYCODONE HYDROCHLORIDE 5 MG/1
5 TABLET ORAL AT BEDTIME
Qty: 3 TABLET | Refills: 0 | Status: SHIPPED | OUTPATIENT
Start: 2024-11-10 | End: 2024-11-13

## 2024-11-10 RX ORDER — GABAPENTIN 300 MG/1
300 CAPSULE ORAL 3 TIMES DAILY PRN
Qty: 30 CAPSULE | Refills: 1 | Status: SHIPPED | OUTPATIENT
Start: 2024-11-10

## 2024-11-10 RX ORDER — NALTREXONE HYDROCHLORIDE 50 MG/1
50 TABLET, FILM COATED ORAL DAILY PRN
Qty: 60 TABLET | Refills: 1 | Status: SHIPPED | OUTPATIENT
Start: 2024-11-10

## 2024-11-10 RX ORDER — POLYVINYL ALCOHOL 14 MG/ML
1 SOLUTION/ DROPS OPHTHALMIC
Qty: 15 ML | Refills: 0 | Status: SHIPPED | OUTPATIENT
Start: 2024-11-10

## 2024-11-10 RX ADMIN — IBUPROFEN 600 MG: 200 TABLET, FILM COATED ORAL at 19:37

## 2024-11-10 RX ADMIN — OXYCODONE HYDROCHLORIDE 10 MG: 5 TABLET ORAL at 19:37

## 2024-11-10 ASSESSMENT — COLUMBIA-SUICIDE SEVERITY RATING SCALE - C-SSRS
6. HAVE YOU EVER DONE ANYTHING, STARTED TO DO ANYTHING, OR PREPARED TO DO ANYTHING TO END YOUR LIFE?: NO
2. HAVE YOU ACTUALLY HAD ANY THOUGHTS OF KILLING YOURSELF IN THE PAST MONTH?: NO
1. IN THE PAST MONTH, HAVE YOU WISHED YOU WERE DEAD OR WISHED YOU COULD GO TO SLEEP AND NOT WAKE UP?: NO

## 2024-11-10 ASSESSMENT — ACTIVITIES OF DAILY LIVING (ADL)
ADLS_ACUITY_SCORE: 0
ADLS_ACUITY_SCORE: 0

## 2024-11-10 ASSESSMENT — VISUAL ACUITY
OD: 20/30
OS: 20/40

## 2024-11-10 NOTE — Clinical Note
Suzy Miles was seen and treated in our emergency department on 11/10/2024.         Sincerely,     [facility]

## 2024-11-10 NOTE — Clinical Note
Suzy Miles was seen and treated in our emergency department on 11/10/2024.         Sincerely,     [facility]

## 2024-11-11 PROCEDURE — 99283 EMERGENCY DEPT VISIT LOW MDM: CPT | Performed by: STUDENT IN AN ORGANIZED HEALTH CARE EDUCATION/TRAINING PROGRAM

## 2024-11-11 NOTE — ED NOTES
Patient has multiple requests.  Provided with a warm blanket, ice pack, chap stick, apple juice, and assisted into recliner with lights dimmed.  Patient informed of the need for a UA to test for blood in her urine.  Will test [facility] when pain medications have begun to be effective.  Imaging completed.
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
## 2024-11-11 NOTE — DISCHARGE INSTRUCTIONS
Thank you for giving us the opportunity to see you. The impression is that you have a broken bone and small amount of blood in your left eye.     The blood in your left eye is from the injury and is not dangerous. It may take 1-2 weeks depending on the amount to be absorbed. Ice packs and artificial tears can improve swelling and provide some relief.    The following medications were given during your stay:   - Oxycodone  - Ibuprofen    You can take Ibuprofen 600 mg every 6 hours for pain at home and alternate with Tylenol 500-1000 mg every 6 hours, so you have a medication you can take every 3 hours.    If you had lab work, cultures or imaging studies done during your stay, the final results may still be pending. We will call you if your plan of care needs to change.    After discharge, please closely monitor for any new or worsening symptoms. Return to the Emergency Department at any time if your symptoms worsen.

## 2024-12-18 ENCOUNTER — HOSPITAL ENCOUNTER (INPATIENT)
Facility: HOSPITAL | Age: 24
End: 2024-12-18
Attending: STUDENT IN AN ORGANIZED HEALTH CARE EDUCATION/TRAINING PROGRAM | Admitting: STUDENT IN AN ORGANIZED HEALTH CARE EDUCATION/TRAINING PROGRAM
Payer: COMMERCIAL

## 2024-12-18 ENCOUNTER — OFFICE VISIT (OUTPATIENT)
Dept: PSYCHIATRY | Facility: OTHER | Age: 24
End: 2024-12-18
Payer: COMMERCIAL

## 2024-12-18 ENCOUNTER — HOSPITAL ENCOUNTER (EMERGENCY)
Facility: OTHER | Age: 24
Discharge: NURSING, REHAB OR LONG-TERM CARE | End: 2024-12-18
Attending: PHYSICIAN ASSISTANT
Payer: COMMERCIAL

## 2024-12-18 ENCOUNTER — TELEPHONE (OUTPATIENT)
Dept: BEHAVIORAL HEALTH | Facility: CLINIC | Age: 24
End: 2024-12-18

## 2024-12-18 VITALS
OXYGEN SATURATION: 100 % | RESPIRATION RATE: 16 BRPM | HEART RATE: 98 BPM | DIASTOLIC BLOOD PRESSURE: 78 MMHG | WEIGHT: 227.8 LBS | BODY MASS INDEX: 35.68 KG/M2 | TEMPERATURE: 98.4 F | SYSTOLIC BLOOD PRESSURE: 117 MMHG

## 2024-12-18 VITALS
SYSTOLIC BLOOD PRESSURE: 113 MMHG | HEART RATE: 72 BPM | TEMPERATURE: 98.3 F | RESPIRATION RATE: 18 BRPM | OXYGEN SATURATION: 100 % | DIASTOLIC BLOOD PRESSURE: 76 MMHG

## 2024-12-18 VITALS
TEMPERATURE: 98.7 F | DIASTOLIC BLOOD PRESSURE: 64 MMHG | RESPIRATION RATE: 17 BRPM | SYSTOLIC BLOOD PRESSURE: 135 MMHG | OXYGEN SATURATION: 100 % | HEART RATE: 84 BPM

## 2024-12-18 DIAGNOSIS — F41.1 GAD (GENERALIZED ANXIETY DISORDER): ICD-10-CM

## 2024-12-18 DIAGNOSIS — B00.9 HSV-2 (HERPES SIMPLEX VIRUS 2) INFECTION: Primary | ICD-10-CM

## 2024-12-18 DIAGNOSIS — R45.851 SUICIDAL IDEATION: ICD-10-CM

## 2024-12-18 DIAGNOSIS — F39 MOOD DISORDER (H): Primary | ICD-10-CM

## 2024-12-18 DIAGNOSIS — F39 MOOD DISORDER (H): ICD-10-CM

## 2024-12-18 DIAGNOSIS — N76.0 BACTERIAL VAGINOSIS: ICD-10-CM

## 2024-12-18 DIAGNOSIS — F19.90 SUBSTANCE USE DISORDER: ICD-10-CM

## 2024-12-18 DIAGNOSIS — B96.89 BACTERIAL VAGINOSIS: ICD-10-CM

## 2024-12-18 DIAGNOSIS — F43.10 PTSD (POST-TRAUMATIC STRESS DISORDER): ICD-10-CM

## 2024-12-18 LAB
ALBUMIN SERPL BCG-MCNC: 4.1 G/DL (ref 3.5–5.2)
ALBUMIN UR-MCNC: NEGATIVE MG/DL
ALP SERPL-CCNC: 75 U/L (ref 40–150)
ALT SERPL W P-5'-P-CCNC: 35 U/L (ref 0–50)
AMPHETAMINES UR QL SCN: NORMAL
ANION GAP SERPL CALCULATED.3IONS-SCNC: 7 MMOL/L (ref 7–15)
APAP SERPL-MCNC: <5 UG/ML (ref 10–30)
APPEARANCE UR: CLEAR
AST SERPL W P-5'-P-CCNC: 30 U/L (ref 0–45)
BACTERIAL VAGINOSIS VAG-IMP: POSITIVE
BARBITURATES UR QL SCN: NORMAL
BASOPHILS # BLD AUTO: 0 10E3/UL (ref 0–0.2)
BASOPHILS NFR BLD AUTO: 1 %
BENZODIAZ UR QL SCN: NORMAL
BILIRUB SERPL-MCNC: 0.2 MG/DL
BILIRUB UR QL STRIP: NEGATIVE
BUN SERPL-MCNC: 8.6 MG/DL (ref 6–20)
BZE UR QL SCN: NORMAL
C TRACH DNA SPEC QL PROBE+SIG AMP: NEGATIVE
CALCIUM SERPL-MCNC: 9.3 MG/DL (ref 8.8–10.4)
CANDIDA DNA VAG QL NAA+PROBE: NOT DETECTED
CANDIDA GLABRATA / CANDIDA KRUSEI DNA: NOT DETECTED
CANNABINOIDS UR QL SCN: NORMAL
CHLORIDE SERPL-SCNC: 101 MMOL/L (ref 98–107)
COLOR UR AUTO: YELLOW
CREAT SERPL-MCNC: 0.71 MG/DL (ref 0.51–0.95)
EGFRCR SERPLBLD CKD-EPI 2021: >90 ML/MIN/1.73M2
EOSINOPHIL # BLD AUTO: 0.1 10E3/UL (ref 0–0.7)
EOSINOPHIL NFR BLD AUTO: 1 %
ERYTHROCYTE [DISTWIDTH] IN BLOOD BY AUTOMATED COUNT: 13.1 % (ref 10–15)
ETHANOL SERPL-MCNC: <0.01 G/DL
FENTANYL UR QL: NORMAL
FLUAV RNA SPEC QL NAA+PROBE: NEGATIVE
FLUBV RNA RESP QL NAA+PROBE: NEGATIVE
GLUCOSE SERPL-MCNC: 92 MG/DL (ref 70–99)
GLUCOSE UR STRIP-MCNC: NEGATIVE MG/DL
HCG UR QL: NEGATIVE
HCO3 SERPL-SCNC: 27 MMOL/L (ref 22–29)
HCT VFR BLD AUTO: 38.7 % (ref 35–47)
HCV AB SERPL QL IA: NONREACTIVE
HGB BLD-MCNC: 12.7 G/DL (ref 11.7–15.7)
HGB UR QL STRIP: NEGATIVE
IMM GRANULOCYTES # BLD: 0 10E3/UL
IMM GRANULOCYTES NFR BLD: 1 %
KETONES UR STRIP-MCNC: NEGATIVE MG/DL
LEUKOCYTE ESTERASE UR QL STRIP: NEGATIVE
LYMPHOCYTES # BLD AUTO: 1.6 10E3/UL (ref 0.8–5.3)
LYMPHOCYTES NFR BLD AUTO: 24 %
MCH RBC QN AUTO: 31 PG (ref 26.5–33)
MCHC RBC AUTO-ENTMCNC: 32.8 G/DL (ref 31.5–36.5)
MCV RBC AUTO: 94 FL (ref 78–100)
MONOCYTES # BLD AUTO: 0.5 10E3/UL (ref 0–1.3)
MONOCYTES NFR BLD AUTO: 8 %
N GONORRHOEA DNA SPEC QL NAA+PROBE: NEGATIVE
NEUTROPHILS # BLD AUTO: 4.2 10E3/UL (ref 1.6–8.3)
NEUTROPHILS NFR BLD AUTO: 66 %
NITRATE UR QL: NEGATIVE
NRBC # BLD AUTO: 0 10E3/UL
NRBC BLD AUTO-RTO: 0 /100
OPIATES UR QL SCN: NORMAL
PCP QUAL URINE (ROCHE): NORMAL
PH UR STRIP: 7.5 [PH] (ref 5–9)
PLATELET # BLD AUTO: 292 10E3/UL (ref 150–450)
POTASSIUM SERPL-SCNC: 4.1 MMOL/L (ref 3.4–5.3)
PROT SERPL-MCNC: 7.4 G/DL (ref 6.4–8.3)
RBC # BLD AUTO: 4.1 10E6/UL (ref 3.8–5.2)
RBC URINE: <1 /HPF
RSV RNA SPEC NAA+PROBE: NEGATIVE
SALICYLATES SERPL-MCNC: <0.3 MG/DL
SARS-COV-2 RNA RESP QL NAA+PROBE: NEGATIVE
SODIUM SERPL-SCNC: 135 MMOL/L (ref 135–145)
SP GR UR STRIP: 1.01 (ref 1–1.03)
T VAGINALIS DNA VAG QL NAA+PROBE: NOT DETECTED
TSH SERPL DL<=0.005 MIU/L-ACNC: 1.12 UIU/ML (ref 0.3–4.2)
UROBILINOGEN UR STRIP-MCNC: NORMAL MG/DL
WBC # BLD AUTO: 6.4 10E3/UL (ref 4–11)
WBC URINE: 0 /HPF

## 2024-12-18 PROCEDURE — 86696 HERPES SIMPLEX TYPE 2 TEST: CPT | Performed by: PHYSICIAN ASSISTANT

## 2024-12-18 PROCEDURE — 86695 HERPES SIMPLEX TYPE 1 TEST: CPT | Performed by: PHYSICIAN ASSISTANT

## 2024-12-18 PROCEDURE — 80179 DRUG ASSAY SALICYLATE: CPT | Performed by: PHYSICIAN ASSISTANT

## 2024-12-18 PROCEDURE — 87491 CHLMYD TRACH DNA AMP PROBE: CPT | Performed by: PHYSICIAN ASSISTANT

## 2024-12-18 PROCEDURE — 80307 DRUG TEST PRSMV CHEM ANLYZR: CPT | Performed by: PHYSICIAN ASSISTANT

## 2024-12-18 PROCEDURE — 87591 N.GONORRHOEAE DNA AMP PROB: CPT | Performed by: PHYSICIAN ASSISTANT

## 2024-12-18 PROCEDURE — 84443 ASSAY THYROID STIM HORMONE: CPT | Performed by: PHYSICIAN ASSISTANT

## 2024-12-18 PROCEDURE — 81001 URINALYSIS AUTO W/SCOPE: CPT | Performed by: PHYSICIAN ASSISTANT

## 2024-12-18 PROCEDURE — 250N000013 HC RX MED GY IP 250 OP 250 PS 637

## 2024-12-18 PROCEDURE — 87521 HEPATITIS C PROBE&RVRS TRNSC: CPT | Performed by: PHYSICIAN ASSISTANT

## 2024-12-18 PROCEDURE — G0463 HOSPITAL OUTPT CLINIC VISIT: HCPCS

## 2024-12-18 PROCEDURE — 80143 DRUG ASSAY ACETAMINOPHEN: CPT | Performed by: PHYSICIAN ASSISTANT

## 2024-12-18 PROCEDURE — 82077 ASSAY SPEC XCP UR&BREATH IA: CPT | Performed by: PHYSICIAN ASSISTANT

## 2024-12-18 PROCEDURE — 80053 COMPREHEN METABOLIC PANEL: CPT | Performed by: PHYSICIAN ASSISTANT

## 2024-12-18 PROCEDURE — 86780 TREPONEMA PALLIDUM: CPT | Performed by: PHYSICIAN ASSISTANT

## 2024-12-18 PROCEDURE — 87516 HEPATITIS B DNA AMP PROBE: CPT | Performed by: PHYSICIAN ASSISTANT

## 2024-12-18 PROCEDURE — 36415 COLL VENOUS BLD VENIPUNCTURE: CPT | Performed by: PHYSICIAN ASSISTANT

## 2024-12-18 PROCEDURE — 86803 HEPATITIS C AB TEST: CPT | Performed by: PHYSICIAN ASSISTANT

## 2024-12-18 PROCEDURE — 99285 EMERGENCY DEPT VISIT HI MDM: CPT | Performed by: PHYSICIAN ASSISTANT

## 2024-12-18 PROCEDURE — 81025 URINE PREGNANCY TEST: CPT | Performed by: PHYSICIAN ASSISTANT

## 2024-12-18 PROCEDURE — 99291 CRITICAL CARE FIRST HOUR: CPT | Performed by: PHYSICIAN ASSISTANT

## 2024-12-18 PROCEDURE — 250N000013 HC RX MED GY IP 250 OP 250 PS 637: Performed by: PHYSICIAN ASSISTANT

## 2024-12-18 PROCEDURE — 0352U MULTIPLEX VAGINAL PANEL BY PCR: CPT | Performed by: PHYSICIAN ASSISTANT

## 2024-12-18 PROCEDURE — 124N000001 HC R&B MH

## 2024-12-18 PROCEDURE — 250N000013 HC RX MED GY IP 250 OP 250 PS 637: Performed by: NURSE PRACTITIONER

## 2024-12-18 PROCEDURE — 85014 HEMATOCRIT: CPT | Performed by: PHYSICIAN ASSISTANT

## 2024-12-18 PROCEDURE — 87637 SARSCOV2&INF A&B&RSV AMP PRB: CPT | Performed by: PHYSICIAN ASSISTANT

## 2024-12-18 PROCEDURE — 85004 AUTOMATED DIFF WBC COUNT: CPT | Performed by: PHYSICIAN ASSISTANT

## 2024-12-18 RX ORDER — MAGNESIUM HYDROXIDE/ALUMINUM HYDROXICE/SIMETHICONE 120; 1200; 1200 MG/30ML; MG/30ML; MG/30ML
30 SUSPENSION ORAL EVERY 4 HOURS PRN
Status: ACTIVE | OUTPATIENT
Start: 2024-12-18

## 2024-12-18 RX ORDER — HYDROXYZINE PAMOATE 25 MG/1
25 CAPSULE ORAL EVERY 4 HOURS PRN
Status: DISCONTINUED | OUTPATIENT
Start: 2024-12-18 | End: 2024-12-18 | Stop reason: HOSPADM

## 2024-12-18 RX ORDER — OLANZAPINE 10 MG/2ML
10 INJECTION, POWDER, FOR SOLUTION INTRAMUSCULAR 3 TIMES DAILY PRN
Status: ACTIVE | OUTPATIENT
Start: 2024-12-18

## 2024-12-18 RX ORDER — ACETAMINOPHEN 325 MG/1
650 TABLET ORAL EVERY 4 HOURS PRN
Status: ACTIVE | OUTPATIENT
Start: 2024-12-18

## 2024-12-18 RX ORDER — GABAPENTIN 300 MG/1
300 CAPSULE ORAL 3 TIMES DAILY
Status: DISCONTINUED | OUTPATIENT
Start: 2024-12-18 | End: 2024-12-18 | Stop reason: HOSPADM

## 2024-12-18 RX ORDER — HYDROXYZINE HYDROCHLORIDE 25 MG/1
25 TABLET, FILM COATED ORAL EVERY 4 HOURS PRN
Status: DISPENSED | OUTPATIENT
Start: 2024-12-18

## 2024-12-18 RX ORDER — METRONIDAZOLE 500 MG/1
500 TABLET ORAL ONCE
Status: COMPLETED | OUTPATIENT
Start: 2024-12-18 | End: 2024-12-18

## 2024-12-18 RX ORDER — OLANZAPINE 10 MG/2ML
10 INJECTION, POWDER, FOR SOLUTION INTRAMUSCULAR
Status: DISCONTINUED | OUTPATIENT
Start: 2024-12-18 | End: 2024-12-18 | Stop reason: HOSPADM

## 2024-12-18 RX ORDER — METRONIDAZOLE 500 MG/1
500 TABLET ORAL 3 TIMES DAILY
Qty: 14 TABLET | Refills: 0 | Status: ON HOLD | OUTPATIENT
Start: 2024-12-18

## 2024-12-18 RX ORDER — METRONIDAZOLE 500 MG/1
500 TABLET ORAL 2 TIMES DAILY
Status: DISPENSED | OUTPATIENT
Start: 2024-12-18 | End: 2024-12-25

## 2024-12-18 RX ORDER — LORAZEPAM 1 MG/1
1 TABLET ORAL 3 TIMES DAILY
Status: DISCONTINUED | OUTPATIENT
Start: 2024-12-18 | End: 2024-12-18 | Stop reason: HOSPADM

## 2024-12-18 RX ORDER — OLANZAPINE 10 MG/1
10 TABLET ORAL 3 TIMES DAILY PRN
Status: DISPENSED | OUTPATIENT
Start: 2024-12-18

## 2024-12-18 RX ADMIN — LORAZEPAM 1 MG: 1 TABLET ORAL at 11:27

## 2024-12-18 RX ADMIN — METRONIDAZOLE 500 MG: 500 TABLET ORAL at 20:47

## 2024-12-18 RX ADMIN — OLANZAPINE 10 MG: 10 TABLET, FILM COATED ORAL at 17:23

## 2024-12-18 RX ADMIN — GABAPENTIN 300 MG: 300 CAPSULE ORAL at 11:27

## 2024-12-18 RX ADMIN — METRONIDAZOLE 500 MG: 500 TABLET ORAL at 15:33

## 2024-12-18 ASSESSMENT — ACTIVITIES OF DAILY LIVING (ADL)
ADLS_ACUITY_SCORE: 42
ADLS_ACUITY_SCORE: 42
ADLS_ACUITY_SCORE: 16
ADLS_ACUITY_SCORE: 42
ADLS_ACUITY_SCORE: 16
ADLS_ACUITY_SCORE: 16
ADLS_ACUITY_SCORE: 42
ADLS_ACUITY_SCORE: 16
ADLS_ACUITY_SCORE: 42
ADLS_ACUITY_SCORE: 16
ADLS_ACUITY_SCORE: 16

## 2024-12-18 ASSESSMENT — ANXIETY QUESTIONNAIRES
1. FEELING NERVOUS, ANXIOUS, OR ON EDGE: NEARLY EVERY DAY
7. FEELING AFRAID AS IF SOMETHING AWFUL MIGHT HAPPEN: NEARLY EVERY DAY
7. FEELING AFRAID AS IF SOMETHING AWFUL MIGHT HAPPEN: NEARLY EVERY DAY
6. BECOMING EASILY ANNOYED OR IRRITABLE: MORE THAN HALF THE DAYS
3. WORRYING TOO MUCH ABOUT DIFFERENT THINGS: NEARLY EVERY DAY
4. TROUBLE RELAXING: NEARLY EVERY DAY
5. BEING SO RESTLESS THAT IT IS HARD TO SIT STILL: NEARLY EVERY DAY
GAD7 TOTAL SCORE: 20
8. IF YOU CHECKED OFF ANY PROBLEMS, HOW DIFFICULT HAVE THESE MADE IT FOR YOU TO DO YOUR WORK, TAKE CARE OF THINGS AT HOME, OR GET ALONG WITH OTHER PEOPLE?: SOMEWHAT DIFFICULT
IF YOU CHECKED OFF ANY PROBLEMS ON THIS QUESTIONNAIRE, HOW DIFFICULT HAVE THESE PROBLEMS MADE IT FOR YOU TO DO YOUR WORK, TAKE CARE OF THINGS AT HOME, OR GET ALONG WITH OTHER PEOPLE: SOMEWHAT DIFFICULT
2. NOT BEING ABLE TO STOP OR CONTROL WORRYING: NEARLY EVERY DAY

## 2024-12-18 ASSESSMENT — COLUMBIA-SUICIDE SEVERITY RATING SCALE - C-SSRS
2. HAVE YOU ACTUALLY HAD ANY THOUGHTS OF KILLING YOURSELF IN THE PAST MONTH?: YES
4. HAVE YOU HAD THESE THOUGHTS AND HAD SOME INTENTION OF ACTING ON THEM?: NO
3. HAVE YOU BEEN THINKING ABOUT HOW YOU MIGHT KILL YOURSELF?: YES
1. IN THE PAST MONTH, HAVE YOU WISHED YOU WERE DEAD OR WISHED YOU COULD GO TO SLEEP AND NOT WAKE UP?: YES
5. HAVE YOU STARTED TO WORK OUT OR WORKED OUT THE DETAILS OF HOW TO KILL YOURSELF? DO YOU INTEND TO CARRY OUT THIS PLAN?: YES
6. HAVE YOU EVER DONE ANYTHING, STARTED TO DO ANYTHING, OR PREPARED TO DO ANYTHING TO END YOUR LIFE?: YES

## 2024-12-18 ASSESSMENT — PAIN SCALES - GENERAL: PAINLEVEL_OUTOF10: NO PAIN (0)

## 2024-12-18 ASSESSMENT — PATIENT HEALTH QUESTIONNAIRE - PHQ9
SUM OF ALL RESPONSES TO PHQ QUESTIONS 1-9: 18
10. IF YOU CHECKED OFF ANY PROBLEMS, HOW DIFFICULT HAVE THESE PROBLEMS MADE IT FOR YOU TO DO YOUR WORK, TAKE CARE OF THINGS AT HOME, OR GET ALONG WITH OTHER PEOPLE: EXTREMELY DIFFICULT
SUM OF ALL RESPONSES TO PHQ QUESTIONS 1-9: 18

## 2024-12-18 NOTE — NURSING NOTE
"Chief Complaint   Patient presents with    Medication Follow-up       Initial There were no vitals taken for this visit. Estimated body mass index is 34.93 kg/m  as calculated from the following:    Height as of 11/10/24: 1.702 m (5' 7\").    Weight as of 11/10/24: 101.2 kg (223 lb).  Medication Review: complete    The next two questions are to help us understand your food security.  If you are feeling you need any assistance in this area, we have resources available to support you today.          8/27/2024   SDOH- Food Insecurity   Within the past 12 months, did you worry that your food would run out before you got money to buy more? N   Within the past 12 months, did the food you bought just not last and you didn t have money to get more? N            Health Care Directive:  Patient does not have a Health Care Directive: Discussed advance care planning with patient; however, patient declined at this time.    Nadege Tran CNA      "

## 2024-12-18 NOTE — ED NOTES
Pt roomed, given food and water. Pt agreeable to taking anxiety medications at this time. Room made safe. Pt agreeable to getting labs done, writer will have pt change after labs are obtained.

## 2024-12-18 NOTE — DISCHARGE INSTRUCTIONS
-You are positive for bacterial vaginosis.  Take Flagyl 500 mg every 12 hours for 7 days for treatment of vaginosis.  This was sent to the pharmacy at [facility].  You may pick this up once you are discharged.  -Return to the ER as needed.  Follow-up with your primary care doctor and mental health specialist as directed.

## 2024-12-18 NOTE — TELEPHONE ENCOUNTER
R: Received call from [facility] stating pt has already been accepted for [facility] from [facility].  I was informed everything is done for the admission and nothing intake needs to take care of other than indicia.  Pt had not yet been added to our [facility] worklist.  Pt added to admit board.  Indicia completed.

## 2024-12-18 NOTE — PROGRESS NOTES
:    Patient presented to the ED with mental health concerns.      [facility] screened patient and accepted.    HARI Pelayo on 12/18/2024 at 3:46 PM

## 2024-12-18 NOTE — ED PROVIDER NOTES
EMERGENCY DEPARTMENT ENCOUNTER      NAME: Suzy Miles  AGE: 24 year old female  YOB: 2000  MRN: [id number]  EVALUATION DATE & TIME: 2024 11:24 AM    PCP: Ashley Jj    ED PROVIDER: Rashard Olsen PA-C       CHIEF COMPLAINT:  Chief Complaint   Patient presents with    Suicidal         HPI  Suzy Miles is a pleasant 24 year old female with history of depression, mood disorder, ADHD, PTSD, and substance use disorder presents to the ER from clinic for evaluation suicide ideation and inpatient mental health placement.  Saw mental health provider JOSE Zambrano just prior to arrival.  Concerns for uncontrolled bipolar 1 with manic symptoms.  Also endorses suicidal ideation and recent methamphetamine use last Saturday.  Patient states that she took over 100 pills of multiple medications on Saturday as she felt suicidal.  Patient states that she does not want to be feel better for her self and her child.      REVIEW OF SYSTEMS   Review of Systems  As above, otherwise ROS is unremarkable.      PAST MEDICAL HISTORY:  Past Medical History:   Diagnosis Date    Anxiety     Depression     Ovarian cyst          PAST SURGICAL HISTORY:  Past Surgical History:   Procedure Laterality Date     SECTION N/A 2020    Procedure:  SECTION;  Surgeon: Eva Ospina MD;  Location:  OR    NO HISTORY OF SURGERY           CURRENT MEDICATIONS:    Current Outpatient Medications   Medication Instructions    cholecalciferol (VITAMIN D3) 125 MCG (5000 UT) TABS tablet DAILY    gabapentin (NEURONTIN) 300 mg, Oral, 3 TIMES DAILY PRN    lisdexamfetamine (VYVANSE) 10 mg, Oral, EVERY MORNING    lurasidone (LATUDA) 20 MG TABS tablet Take 1/2 tab for 1 week, then increase to full tablet daily with food.    multivitamin w/minerals (MULTIVITAMINS W/MINERALS) tablet 1 tablet, DAILY    naltrexone (DEPADE/REVIA) 50 mg, Oral, DAILY PRN, Take 1 hour prior to environment where you may be at risk  of drinking alcohol or with cravings. Do not take with opioid medications, will block their effect.    polyvinyl alcohol (LIQUIFILM TEARS) 1.4 % ophthalmic solution 1 drop, Left Eye, EVERY 2 HOURS PRN    propranolol (INDERAL) 10 mg, Oral, 3 TIMES DAILY PRN    valACYclovir (VALTREX) 1,000 mg, Oral, 2 TIMES DAILY         ALLERGIES:  No Known Allergies      FAMILY HISTORY:  Family History   Problem Relation Age of Onset    Ovarian cysts Mother     Diabetes Maternal Grandmother          SOCIAL HISTORY:   Social History     Socioeconomic History    Marital status: Single   Tobacco Use    Smoking status: Some Days     Current packs/day: 0.00     Types: Cigarettes     Last attempt to quit: 11/15/2019     Years since quittin.0     Passive exposure: Never    Smokeless tobacco: Never   Vaping Use    Vaping status: Never Used   Substance and Sexual Activity    Alcohol use: Yes     Comment: will drink whole bottle of vodka through out the whole day with beer also about 1-2 times a week    Drug use: Not Currently     Comment: addaeral    Sexual activity: Yes     Partners: Male     Social Drivers of Health     Financial Resource Strain: Low Risk  (2024)    Financial Resource Strain     Within the past 12 months, have you or your family members you live with been unable to get utilities (heat, electricity) when it was really needed?: No   Food Insecurity: Low Risk  (2024)    Food Insecurity     Within the past 12 months, did you worry that your food would run out before you got money to buy more?: No     Within the past 12 months, did the food you bought just not last and you didn t have money to get more?: No   Transportation Needs: High Risk (2024)    Transportation Needs     Within the past 12 months, has lack of transportation kept you from medical appointments, getting your medicines, non-medical meetings or appointments, work, or from getting things that you need?: Yes   Interpersonal Safety: Low Risk   (12/18/2024)    Interpersonal Safety     Do you feel physically and emotionally safe where you currently live?: Yes     Within the past 12 months, have you been hit, slapped, kicked or otherwise physically hurt by someone?: No     Within the past 12 months, have you been humiliated or emotionally abused in other ways by your partner or ex-partner?: No   Recent Concern: Interpersonal Safety - At Risk (9/25/2024)    [facility]     IP Custom IPV     Do you feel UNSAFE in any of your personal relationships with your family members or any other acquaintances?: Yes   Housing Stability: Low Risk  (1/17/2024)    Housing Stability     Do you have housing? : Yes     Are you worried about losing your housing?: No       ==================================================================================================================================    PHYSICAL EXAM    VITAL SIGNS: /76   Pulse 72   Temp 98.3  F (36.8  C) (Tympanic)   Resp 18   LMP 12/16/2024 (Exact Date)   SpO2 100%     Patient Vitals for the past 24 hrs:   BP Temp Temp src Pulse Resp SpO2   12/18/24 1108 113/76 98.3  F (36.8  C) Tympanic 72 18 100 %       Physical Exam  Vitals and nursing note reviewed.   Constitutional:       Appearance: Normal appearance.   HENT:      Nose: Nose normal.      Mouth/Throat:      Mouth: Mucous membranes are moist.      Pharynx: Oropharynx is clear.   Eyes:      Conjunctiva/sclera: Conjunctivae normal.   Cardiovascular:      Rate and Rhythm: Normal rate and regular rhythm.      Pulses: Normal pulses.      Heart sounds: Normal heart sounds.   Pulmonary:      Effort: Pulmonary effort is normal.      Breath sounds: Normal breath sounds.   Musculoskeletal:         General: Normal range of motion.      Cervical back: Normal range of motion.   Skin:     General: Skin is warm and dry.   Neurological:      General: No focal deficit present.      Mental Status: She is alert.    Psychiatric:         Mood and Affect: Mood is depressed.         Speech: Speech normal.         Behavior: Behavior is cooperative.         Thought Content: Thought content includes suicidal ideation. Thought content does not include homicidal ideation.            ==================================================================================================================================    LABS & RADIOLOGY:  All pertinent labs reviewed and interpreted. Reviewed all pertinent imaging. Please see official radiology report.  Results for orders placed or performed during the hospital encounter of 12/18/24   Acetaminophen level   Result Value Ref [facility]    Acetaminophen <5.0 (L) 10.0 - 30.0 ug/mL   Comprehensive metabolic panel   Result Value Ref Range    Sodium 135 135 - 145 mmol/L    Potassium 4.1 3.4 - 5.3 mmol/L    Carbon Dioxide (CO2) 27 22 - 29 mmol/L    Anion Gap 7 7 - 15 mmol/L    Urea Nitrogen 8.6 6.0 - 20.0 mg/dL    Creatinine 0.71 0.51 - 0.95 mg/dL    GFR Estimate >90 >60 mL/min/1.73m2    Calcium 9.3 8.8 - 10.4 mg/dL    Chloride 101 98 - 107 mmol/L    Glucose 92 70 - 99 mg/dL    Alkaline Phosphatase 75 40 - 150 U/L    AST 30 0 - 45 U/L    ALT 35 0 - 50 U/L    Protein Total 7.4 6.4 - 8.3 g/dL    Albumin 4.1 3.5 - 5.2 g/dL    Bilirubin Total 0.2 <=1.2 mg/dL   Ethanol GH   Result Value Ref Range    Alcohol ethyl <0.01 <=0.01 g/dL   HCG qualitative urine   Result Value Ref Range    hCG Urine Qualitative Negative Negative   Salicylate level   Result Value Ref Range    Salicylate <0.3   mg/dL   TSH Reflex GH   Result Value Ref Range    TSH 1.12 0.30 - 4.20 uIU/mL   UA with Microscopic reflex to Culture    Specimen: Urine, Midstream   Result Value Ref Range    Color Urine Yellow Colorless, Straw, Light Yellow, Yellow    Appearance Urine Clear Clear    Glucose Urine Negative Negative mg/dL    Bilirubin Urine Negative Negative    Ketones Urine Negative Negative mg/dL    Specific Gravity Urine 1.010 1.000 - 1.030     Blood Urine Negative Negative    pH Urine 7.5 5.0 - 9.0    Protein Albumin Urine Negative Negative mg/dL    Urobilinogen Urine Normal Normal, 2.0 mg/dL    Nitrite Urine Negative Negative    Leukocyte Esterase Urine Negative Negative    RBC Urine <1 <=2 /HPF    WBC Urine 0 <=5 /HPF   CBC with platelets and differential   Result Value Ref Range    WBC Count 6.4 4.0 - 11.0 10e3/uL    RBC Count 4.10 3.80 - 5.20 10e6/uL    Hemoglobin 12.7 11.7 - 15.7 g/dL    Hematocrit 38.7 35.0 - 47.0 %    MCV 94 78 - 100 fL    MCH 31.0 26.5 - 33.0 pg    MCHC 32.8 31.5 - 36.5 g/dL    RDW 13.1 10.0 - 15.0 %    Platelet Count 292 150 - 450 10e3/uL    % Neutrophils 66 %    % Lymphocytes 24 %    % Monocytes 8 %    % Eosinophils 1 %    % Basophils 1 %    % Immature Granulocytes 1 %    NRBCs per 100 WBC 0 <1 /100    Absolute Neutrophils 4.2 1.6 - 8.3 10e3/uL    Absolute Lymphocytes 1.6 0.8 - 5.3 10e3/uL    Absolute Monocytes 0.5 0.0 - 1.3 10e3/uL    Absolute Eosinophils 0.1 0.0 - 0.7 10e3/uL    Absolute Basophils 0.0 0.0 - 0.2 10e3/uL    Absolute Immature Granulocytes 0.0 <=0.4 10e3/uL    Absolute NRBCs 0.0 10e3/uL   Urine Drug Screen Panel   Result Value Ref Range    Amphetamines Urine Screen Negative Screen Negative    Barbituates Urine Screen Negative Screen Negative    Benzodiazepine Urine Screen Negative Screen Negative    Cannabinoids Urine Screen Negative Screen Negative    Cocaine Urine Screen Negative Screen Negative    Fentanyl Qual Urine Screen Negative Screen Negative    Opiates Urine Screen Negative Screen Negative    PCP Urine Screen Negative Screen Negative   Chlamydia trachomatis/Neisseria gonorrhoeae by PCR    Specimen: Urine, Voided   Result Value Ref Range    Chlamydia Trachomatis Negative Negative    Neisseria gonorrhoeae Negative Negative   Multiplex Vaginal Panel by PCR    Specimen: Vagina; Swab   Result Value Ref Range    Bacterial Vaginosis Organism DNA Positive (A) Negative    Candida Group DNA Not Detected Not  Detected    Candida glabrata / Lissett krusei DNA Not Detected Not Detected    Trichomonas vaginalis DNA Not Detected Not Detected     No orders to display         ==================================================================================================================================    ED COURSE, MEDICAL DECISION MAKING, FINAL IMPRESSION AND PLAN:     Assessment / Plan:  1. Suicidal ideation    2. Mood disorder (H)    3. Bacterial vaginosis        The patient was interviewed and examined.  HPI and physical exam as below.  Differential diagnosis and MDM Key Documentation Elements as below.  Vitals, triage note, and nursing notes were reviewed.  /76   Pulse 72   Temp 98.3  F (36.8  C) (Tympanic)   Resp 18   LMP 12/16/2024 (Exact Date)   SpO2 100%     Differential includes but is not limited to suicide ideation, drug use, bipolar, psychosis    Patient referred to the ER by mental health for concerns of suicidal ideation.  Patient was taking pills of unknown quantity few days ago.  Also endorsed feelings of committing suicide by motor vehicle accident.  Patient does have a young child which she is concerned about.  Mother was able to care for child.  Patient willing to go to treatment.  Laboratory studies today were otherwise reassuring.  Patient was concerned for possible STI exposure from her boyfriend.  Gonorrhea and Chlamydia negative.  Not pregnant.  Remaining STI workup pending.  Patient was positive for vaginosis and started on Flagyl here in the ER.    Patient was anxious during course of stay.  Patient was given Ativan, Vistaril, gabapentin which did help with symptoms.  Patient will be prescribed Flagyl 500 mg every 12 hours for 7 days.  Patient is otherwise cleared from medical standpoint for transfer for inpatient mental health workup.  No objective findings to keep here in the ER or for hospital admission.    Pertinent Labs & Imaging studies reviewed. (See chart for details)  Results  for orders placed or performed during the hospital encounter of 12/18/24   Acetaminophen level   Result Value Ref [facility]    Acetaminophen <5.0 (L) 10.0 - 30.0 ug/mL   Comprehensive metabolic panel   Result Value Ref Range    Sodium 135 135 - 145 mmol/L    Potassium 4.1 3.4 - 5.3 mmol/L    Carbon Dioxide (CO2) 27 22 - 29 mmol/L    Anion Gap 7 7 - 15 mmol/L    Urea Nitrogen 8.6 6.0 - 20.0 mg/dL    Creatinine 0.71 0.51 - 0.95 mg/dL    GFR Estimate >90 >60 mL/min/1.73m2    Calcium 9.3 8.8 - 10.4 mg/dL    Chloride 101 98 - 107 mmol/L    Glucose 92 70 - 99 mg/dL    Alkaline Phosphatase 75 40 - 150 U/L    AST 30 0 - 45 U/L    ALT 35 0 - 50 U/L    Protein Total 7.4 6.4 - 8.3 g/dL    Albumin 4.1 3.5 - 5.2 g/dL    Bilirubin Total 0.2 <=1.2 mg/dL   Ethanol GH   Result Value Ref Range    Alcohol ethyl <0.01 <=0.01 g/dL   HCG qualitative urine   Result Value Ref Range    hCG Urine Qualitative Negative Negative   Salicylate level   Result Value Ref Range    Salicylate <0.3   mg/dL   TSH Reflex GH   Result Value Ref Range    TSH 1.12 0.30 - 4.20 uIU/mL   UA with Microscopic reflex to Culture    Specimen: Urine, Midstream   Result Value Ref Range    Color Urine Yellow Colorless, Straw, Light Yellow, Yellow    Appearance Urine Clear Clear    Glucose Urine Negative Negative mg/dL    Bilirubin Urine Negative Negative    Ketones Urine Negative Negative mg/dL    Specific Gravity Urine 1.010 1.000 - 1.030    Blood Urine Negative Negative    pH Urine 7.5 5.0 - 9.0    Protein Albumin Urine Negative Negative mg/dL    Urobilinogen Urine Normal Normal, 2.0 mg/dL    Nitrite Urine Negative Negative    Leukocyte Esterase Urine Negative Negative    RBC Urine <1 <=2 /HPF    WBC Urine 0 <=5 /HPF   CBC with platelets and differential   Result Value Ref Range    WBC Count 6.4 4.0 - 11.0 10e3/uL    RBC Count 4.10 3.80 - 5.20 10e6/uL    Hemoglobin 12.7 11.7 - 15.7 g/dL    Hematocrit 38.7 35.0 - 47.0 %    MCV 94 78 - 100 fL    MCH 31.0 26.5 - 33.0 pg     "MCHC 32.8 31.5 - 36.5 g/dL    RDW 13.1 10.0 - 15.0 %    Platelet Count 292 150 - 450 10e3/uL    % Neutrophils 66 %    % Lymphocytes 24 %    % Monocytes 8 %    % Eosinophils 1 %    % Basophils 1 %    % Immature Granulocytes 1 %    NRBCs per 100 WBC 0 <1 /100    Absolute Neutrophils 4.2 1.6 - 8.3 10e3/uL    Absolute Lymphocytes 1.6 0.8 - 5.3 10e3/uL    Absolute Monocytes 0.5 0.0 - 1.3 10e3/uL    Absolute Eosinophils 0.1 0.0 - 0.7 10e3/uL    Absolute Basophils 0.0 0.0 - 0.2 10e3/uL    Absolute Immature Granulocytes 0.0 <=0.4 10e3/uL    Absolute NRBCs 0.0 10e3/uL   Urine Drug Screen Panel   Result Value Ref Range    Amphetamines Urine Screen Negative Screen Negative    Barbituates Urine Screen Negative Screen Negative    Benzodiazepine Urine Screen Negative Screen Negative    Cannabinoids Urine Screen Negative Screen Negative    Cocaine Urine Screen Negative Screen Negative    Fentanyl Qual Urine Screen Negative Screen Negative    Opiates Urine Screen Negative Screen Negative    PCP Urine Screen Negative Screen Negative   Chlamydia trachomatis/Neisseria gonorrhoeae by PCR    Specimen: Urine, Voided   Result Value Ref Range    Chlamydia Trachomatis Negative Negative    Neisseria gonorrhoeae Negative Negative   Multiplex Vaginal Panel by PCR    Specimen: Vagina; Swab   Result Value Ref Range    Bacterial Vaginosis Organism DNA Positive (A) Negative    Candida Group DNA Not Detected Not Detected    Candida glabrata / Lissett krusei DNA Not Detected Not Detected    Trichomonas vaginalis DNA Not Detected Not Detected     No results found for: \"ABORH\"      Reassessments, Medications, Interventions, & Response to Treatments:  -as above    Medications given during today's ER visit:  Medications   hydrOXYzine donny (VISTARIL) capsule 25 mg (has no administration in time range)   LORazepam (ATIVAN) tablet 1 mg (1 mg Oral $Given 12/18/24 1127)   OLANZapine (zyPREXA) injection 10 mg (has no administration in time range)   gabapentin " (NEURONTIN) capsule 300 mg (300 mg Oral $Given 12/18/24 1127)   melatonin tablet 3 mg (has no administration in time range)   metroNIDAZOLE (FLAGYL) tablet 500 mg (has no administration in time range)       Consultations:  None    Decision Rules, Medical Calculators, and Risk Stratification Tools:  None    MDM Key Documentation Elements for Patient's Evaluation:  Differential diagnosis to include high risk considerations: As above  Escalation to admission/observation considered: Admission/observation considered, transferred to inpatient mental health  Discussions and management with other clinicians:    3a. Independent interpretation of testing performed by another health professional:  -No  3b. Discussion of management or test interpretation with another health professional: -No  Independent interpretation of tests:  Ordering and/or review of 3+ test(s)  Discussion of test interpretations with radiology:  No  History obtained from source other than patient or assessment requiring an independent historian:  No  Review of non-ED/external records:  review of 3+ records  Diagnostic tests considered but not ultimately performed/deferred:  -Chest x-ray  Prescription medications considered but not prescribed:  -Patient was admitted  Chronic conditions affecting care:  -None  Care affected by social determinants of health:  -None    The patient's management involved:   - Laboratory studies  - Prescription drug management  - Decision regarding hospitalization    A shared decision making model was used. Time was taken to answer all questions.  Patient and/or associated parties understood and were agreeable to treatment plan.  Plan and all results were discussed      New prescriptions started at today's ER visit  New Prescriptions    METRONIDAZOLE (FLAGYL) 500 MG TABLET    Take 1 tablet (500 mg) by mouth 3 times daily.        ==================================================================================================================================      Jesse VALENCIA PA-C, personally performed the services described in this documentation, and it is both accurate and complete.       Rashard Olsen PA-C  12/18/24 1451
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
## 2024-12-18 NOTE — PLAN OF CARE
ADMISSION NOTE    Reason for admission SI with plan.  Safety concerns not at this time .  Risk for or history of violence none.   Full skin assessment: WDL    Patient arrived on unit from [facility] ED accompanied by EMS and security. on 12/18/2024  5:03 PM.   Status on arrival: Anxious and pacing.   /64   Pulse 84   Temp 98.7  F (37.1  C) (Temporal)   Resp 17   LMP 12/16/2024 (Exact Date)   SpO2 100%   Patient given tour of unit and Welcome to  unit papers given to patient, wanding completed, belongings inventoried, and admission assessment completed. Patient's legal status on arrival is 72HH. Appropriate legal rights discussed with and copy given to patient. Patient Bill of Rights discussed with and copy given to patient. Patient denies SI, HI, and thoughts of self harm and contracts for safety while on unit.        Problem: Adult Behavioral Health Plan of Care  Goal: Patient-Specific Goal (Individualization)  Description: Pt will eat at least 50% of meals while on the unit.   Pt will sleep 6-8 hours per night.  Pt will attend at least 2 groups per day.   Pt will be medication compliant.   Pt will follow treatment team recommendations.   12/18/2024 Pt is in MHICU due to need for decreased stimuli. Treatment team to assess daily.   Outcome: Progressing     Problem: Suicide Risk  Goal: Absence of Self-Harm  Description: Pt will be free from self harm while on the unit.   Pt will identify at least 2 coping skills for suicidal ideation.   Outcome: Progressing   Goal Outcome Evaluation:    Pt denies SI/HI and pain. Pt endorses anxiety. Pt is medication compliant and VSS. Pt ate 75% of dinner in room. Pt has a flat affect. Pt is alert and able to make needs known. Pt is using appropriate behavior with staff and peers. 1723 PRN Zyprexa 10mg for agitation and anxiety. 1830 Pt in bed sleeping.     Face to face report will be communicated to oncoming RN.    Lexie Pantoja RN  12/18/2024

## 2024-12-19 LAB
HOLD SPECIMEN: NORMAL
HOLD SPECIMEN: NORMAL
HSV1 IGG SERPL QL IA: <0.01 INDEX
HSV1 IGG SERPL QL IA: ABNORMAL
HSV2 IGG SERPL QL IA: 2.17 INDEX
HSV2 IGG SERPL QL IA: ABNORMAL
T PALLIDUM AB SER QL: NONREACTIVE

## 2024-12-19 PROCEDURE — 99223 1ST HOSP IP/OBS HIGH 75: CPT | Mod: AI

## 2024-12-19 PROCEDURE — 36415 COLL VENOUS BLD VENIPUNCTURE: CPT | Performed by: NURSE PRACTITIONER

## 2024-12-19 PROCEDURE — 124N000001 HC R&B MH

## 2024-12-19 PROCEDURE — 250N000013 HC RX MED GY IP 250 OP 250 PS 637

## 2024-12-19 PROCEDURE — 82306 VITAMIN D 25 HYDROXY: CPT | Performed by: NURSE PRACTITIONER

## 2024-12-19 PROCEDURE — 250N000013 HC RX MED GY IP 250 OP 250 PS 637: Performed by: NURSE PRACTITIONER

## 2024-12-19 PROCEDURE — 99222 1ST HOSP IP/OBS MODERATE 55: CPT | Performed by: NURSE PRACTITIONER

## 2024-12-19 RX ORDER — DULOXETIN HYDROCHLORIDE 20 MG/1
40 CAPSULE, DELAYED RELEASE ORAL DAILY
Status: ON HOLD | COMMUNITY
Start: 2024-10-07

## 2024-12-19 RX ORDER — GABAPENTIN 300 MG/1
300 CAPSULE ORAL 3 TIMES DAILY PRN
Status: ACTIVE | OUTPATIENT
Start: 2024-12-19

## 2024-12-19 RX ORDER — MULTIPLE VITAMINS W/ MINERALS TAB 9MG-400MCG
1 TAB ORAL DAILY
Status: DISPENSED | OUTPATIENT
Start: 2024-12-19

## 2024-12-19 RX ORDER — VALACYCLOVIR HYDROCHLORIDE 500 MG/1
1000 TABLET, FILM COATED ORAL 2 TIMES DAILY
Status: DISCONTINUED | OUTPATIENT
Start: 2024-12-19 | End: 2024-12-19

## 2024-12-19 RX ORDER — VALACYCLOVIR HYDROCHLORIDE 500 MG/1
1000 TABLET, FILM COATED ORAL DAILY
Status: ACTIVE | OUTPATIENT
Start: 2024-12-20

## 2024-12-19 RX ORDER — CARBOXYMETHYLCELLULOSE SODIUM 5 MG/ML
1 SOLUTION/ DROPS OPHTHALMIC
Status: ACTIVE | OUTPATIENT
Start: 2024-12-19

## 2024-12-19 RX ORDER — DULOXETIN HYDROCHLORIDE 20 MG/1
40 CAPSULE, DELAYED RELEASE ORAL DAILY
Status: ACTIVE | OUTPATIENT
Start: 2024-12-19

## 2024-12-19 RX ORDER — PROPRANOLOL HYDROCHLORIDE 10 MG/1
10 TABLET ORAL 3 TIMES DAILY PRN
Status: DISCONTINUED | OUTPATIENT
Start: 2024-12-19 | End: 2024-12-19

## 2024-12-19 RX ADMIN — Medication 1 TABLET: at 11:04

## 2024-12-19 RX ADMIN — METRONIDAZOLE 500 MG: 500 TABLET ORAL at 08:49

## 2024-12-19 RX ADMIN — OLANZAPINE 10 MG: 10 TABLET, FILM COATED ORAL at 11:04

## 2024-12-19 ASSESSMENT — ACTIVITIES OF DAILY LIVING (ADL)
ADLS_ACUITY_SCORE: 16
DRESS: INDEPENDENT;SCRUBS (BEHAVIORAL HEALTH)
ADLS_ACUITY_SCORE: 16
ORAL_HYGIENE: INDEPENDENT
ADLS_ACUITY_SCORE: 16
LAUNDRY: UNABLE TO COMPLETE
ADLS_ACUITY_SCORE: 16
HYGIENE/GROOMING: INDEPENDENT

## 2024-12-19 NOTE — PLAN OF CARE
"  Problem: Adult Behavioral Health Plan of Care  Goal: Patient-Specific Goal (Individualization)  Description: Pt will eat at least 50% of meals while on the unit.   Pt will sleep 6-8 hours per night.  Pt will attend at least 2 groups per day.   Pt will be medication compliant.   Pt will follow treatment team recommendations.     12/19/2024 Pt is in MHICU due to need for decreased stimuli. No wean at this time due to labile mood. Treatment team to assess daily.       Outcome: Progressing     Problem: Suicide Risk  Goal: Absence of Self-Harm  Description: Pt will be free from self harm while on the unit.   Pt will identify at least 2 coping skills for suicidal ideation.   Outcome: Progressing   Goal Outcome Evaluation:      Pt in bed in the MHICU at the start of the shift. Pt had been irritable about staff asking her questions so nursing left her to sleep at the start of the shift. Pt up for dinner, ate her meal and went back to sleep. Pt slept through most of the shift, refused to take her medications this shift. Nursing attempted to wake her for her antibiotic but pt shook her head and stated \"no\". Pt refused to answer any questions for nursing assessment.              Face to face end of shift report communicated to night shift RN. Reported that pt is a risk for suicide.     Rosalinda Lagunas RN  12/19/2024  3:39 PM                    "

## 2024-12-19 NOTE — PROGRESS NOTES
12/18/24 1814   Patient Belongings   Did you bring any home meds/supplements to the hospital?  No   Patient Belongings locker;sent to security per site process   Patient Belongings Put in Hospital Secure Location (Security or Locker, etc.) clothing;shoes;other (see comments)   Belongings Search Yes   Clothing Search Yes   Second Staff Tia   Comment black leggings, underwear x2, brown tank top, red bra, black hoodie, blue hat, charging block and cord, converse shoes, red/black gloves, tampon, pair of socks, carmex     List items sent to safe: black iphone in black case (cracks on back, scratches on screen), black wallet, [place] DL, [place] [employer] fuel card, [employer] rewards card, [employer] bank debit card, $72 cash, [place] EBT card, green visa debit, red/blue/yellow/white beaded necklace with yellow pouch and beaded cross  All other belongings put in assigned cubby in belongings room.     I have reviewed my belongings list on admission and verify that it is correct.     Patient signature_______________________________    Second staff witness (if patient unable to sign) ______________________________       I have received all my belongings at discharge.    Patient signature________________________________    Nasra  12/18/2024  6:16 PM
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
## 2024-12-19 NOTE — PLAN OF CARE
Problem: Suicide Risk  Goal: Absence of Self-Harm  Description: Pt will be free from self harm while on the unit.   Pt will identify at least 2 coping skills for suicidal ideation.   Outcome: Progressing     Problem: Adult Behavioral Health Plan of Care  Goal: Patient-Specific Goal (Individualization)  Description: Pt will eat at least 50% of meals while on the unit.   Pt will sleep 6-8 hours per night.  Pt will attend at least 2 groups per day.   Pt will be medication compliant.   Pt will follow treatment team recommendations.     12/19/2024 Pt is in MHICU due to need for decreased stimuli. No wean at this time due to labile mood. Treatment team to assess daily.       Outcome: Progressing     Patient remains in MHICU due to hx of aggression and irritable mood.  Today she is labile and upset that she has had to talk to so many people.  States she is done answering questions and sick of people.  She is polite when making requests of staff for food and supplies.  Did participate in nursing assessment.  Reported feeling anxious and took Zyprexa 10 mg at 1104.  No complaints of pain.  Refused VS.  Face to face end of shift report communicated to evening shift RN.     Mary Villatoro RN  12/19/2024  1:18 PM
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
## 2024-12-19 NOTE — MEDICATION SCRIBE - ADMISSION MEDICATION HISTORY
Medication Scribe Admission Medication History    Admission medication history is complete. The information provided in this note is only as accurate as the sources available at the time of the update.    Information Source(s): [facility] via N/A    Pertinent Information:   Patient refused to review medications at this time. Per dispense hx- the most recent medications filled were gabapentin on 12/10/24 (30 caps), naltrexone on 11/25/24 and duloxetine on 11/11/23 (46 caps/23 day supply). The remaining medications listed have not been filled for 6 months or greater and patient is likely not taking them.   Flagyl- started in transferring facility ER. Pt had one dose while there.     Changes made to PTA medication list:  Added: cymbalta  Deleted: None  Changed: None    Allergies reviewed with patient and updates made in EHR: unable to assess    Medication History Completed By: Alyce Crandall 12/19/2024 1:19 PM    PTA Med List   Medication Sig Note Last Dose/Taking    metroNIDAZOLE (FLAGYL) 500 MG tablet Take 1 tablet (500 mg) by mouth 3 times daily. 12/19/2024: Started in ER yesterday, 1 dose given while there.  12/18/2024 at  3:33 PM

## 2024-12-19 NOTE — PLAN OF CARE
Face to face end of shift report will be communicated to oncoming RN.    Problem: Adult Behavioral Health Plan of Care  Goal: Patient-Specific Goal (Individualization)  Description: Pt will eat at least 50% of meals while on the unit.   Pt will sleep 6-8 hours per night.  Pt will attend at least 2 groups per day.   Pt will be medication compliant.   Pt will follow treatment team recommendations.     12/18/2024 Pt is in MHICU due to need for decreased stimuli. Treatment team to assess daily.       Outcome: Progressing     Problem: Suicide Risk  Goal: Absence of Self-Harm  Description: Pt will be free from self harm while on the unit.   Pt will identify at least 2 coping skills for suicidal ideation.   Outcome: Progressing  Face to face end of shift report obtained from HECTOR Owen. Pt observed resting in bed.   Pt appears to be sleeping in bed with eyes closed. 15 minutes and PRN safety checks completed with no noted complains. No self harm noted or reported so far this shift.   0600-Pt appeared to had slept all night.

## 2024-12-19 NOTE — H&P
[facility]    History and Physical  Medical Services       Date of Admission:  12/18/2024  Date of Service (when I saw the patient): 12/19/24    Assessment & Plan     Principal Problem:    Suicidal ideation    Active Medical Problems:  Bacterial vaginosis-multiplex vaginal panel positive for BV. Started on Flagyl in the ED. Continue Flagyl for 7 days.     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 Pt reports she has been aware for some time. Reports she takes valtrex for outbreaks. Denies current outbreak. Expresses she would like to resume for suppression. Will order valtrex 1000 mg po every day.     Vitamin D deficiency- last vitamin D level on 1/17/24 on the low end of normal. Multivitamin ordered. Will get Vitamin D level.     Pt medically stable, no acute medical concerns. Chronic medical problems stable. Will sign off. Please consult for any new medical issues or concerns.      Code Status: Full Code    Vivian Kuo CNP    Primary Care Physician   Ashley Jj    Chief Complaint   Psych evaluation     History is obtained from the patient and electronic health record    History of Present Illness   Suzy Miles is a pleasant 24 year old female with history of depression, mood disorder, ADHD, PTSD, and substance use disorder presents to the ER from clinic for evaluation suicide ideation and inpatient mental health placement.  Saw mental health provider JOSE Zambrano just prior to arrival.  Concerns for uncontrolled bipolar 1 with manic symptoms.  Also endorses suicidal ideation and recent methamphetamine use last Saturday.  Patient states that she took over 100 pills of multiple medications on Saturday as she felt suicidal.  Patient states that she does not want to be feel better for her self and her child.     Past Medical History    I have reviewed this patient's medical history and updated it with pertinent information if needed.   Past Medical History:   Diagnosis Date    Anxiety     Depression      Ovarian cyst        Past Surgical History   I have reviewed this patient's surgical history and updated it with pertinent information if needed.  Past Surgical History:   Procedure Laterality Date     SECTION N/A 2020    Procedure:  SECTION;  Surgeon: Eva Ospina MD;  Location:  OR    NO HISTORY OF SURGERY         Prior to Admission Medications   Prior to Admission Medications   Prescriptions Last Dose Informant Patient Reported? Taking?   cholecalciferol (VITAMIN D3) 125 MCG (5000 UT) TABS tablet   Yes No   Sig: Take by mouth daily.   gabapentin (NEURONTIN) 300 MG capsule   No No   Sig: Take 1 capsule (300 mg) by mouth 3 times daily as needed (cravings, anxiety).   lisdexamfetamine (VYVANSE) 10 MG capsule   No No   Sig: Take 1 capsule (10 mg) by mouth every morning   lurasidone (LATUDA) 20 MG TABS tablet   No No   Sig: Take 1/2 tab for 1 week, then increase to full tablet daily with food.   metroNIDAZOLE (FLAGYL) 500 MG tablet 2024 at  3:33 PM  No Yes   Sig: Take 1 tablet (500 mg) by mouth 3 times daily.   multivitamin w/minerals (MULTIVITAMINS W/MINERALS) tablet   Yes No   Sig: Take 1 tablet by mouth daily.   naltrexone (DEPADE/REVIA) 50 MG tablet   No No   Sig: Take 1 tablet (50 mg) by mouth daily as needed (alcohol cravings). Take 1 hour prior to environment where you may be at risk of drinking alcohol or with cravings. Do not take with opioid medications, will block their effect.   polyvinyl alcohol (LIQUIFILM TEARS) 1.4 % ophthalmic solution   No No   Sig: Place 1 drop Into the left eye every 2 hours as needed for dry eyes.   propranolol (INDERAL) 10 MG tablet   No No   Sig: Take 1 tablet (10 mg) by mouth 3 times daily as needed (for anxiety)   valACYclovir (VALTREX) 1000 mg tablet   No No   Sig: Take 1 tablet (1,000 mg) by mouth 2 times daily      Facility-Administered Medications: None     Allergies   No Known Allergies    Social History   I have reviewed this  patient's social history and updated it with pertinent information if needed. Suzy Miles  reports that she has been smoking cigarettes. She has never been exposed to tobacco smoke. She has never used smokeless tobacco. She reports current alcohol use. She reports that she does not currently use drugs.    Family History   I have reviewed this patient's family history and updated it with pertinent information if needed.   Family History   Problem Relation Age of Onset    Ovarian cysts Mother     Diabetes Maternal Grandmother        Review of Systems   CONSTITUTIONAL:  negative  EYES:  negative  HEENT:  negative  RESPIRATORY:  negative  CARDIOVASCULAR:  negative  GASTROINTESTINAL:  negative  GENITOURINARY:  negative  INTEGUMENT/BREAST:  negative  HEMATOLOGIC/LYMPHATIC:  negative  ALLERGIC/IMMUNOLOGIC:  negative  ENDOCRINE:  negative  MUSCULOSKELETAL:  negative  NEUROLOGICAL:  negative    Physical Exam   Temp: 98.7  F (37.1  C) Temp src: Temporal BP: 135/64 Pulse: 84   Resp: 17 SpO2: 100 % O2 Device: None (Room air)    Vital Signs with Ranges  Temp:  [98.7  F (37.1  C)] 98.7  F (37.1  C)  Pulse:  [84] 84  Resp:  [17] 17  BP: (135)/(64) 135/64  SpO2:  [100 %] 100 %  0 lbs 0 oz    Constitutional: awake, alert, cooperative, no apparent distress, and appears stated age, vitals stable   Eyes: Lids and lashes normal, pupils equal, round and reactive to light, extra ocular muscles intact, sclera clear, conjunctiva normal  ENT: Normocephalic, without obvious abnormality, atraumatic, external ears without lesions, oral pharynx with moist mucous membranes, no erythema or exudates  Hematologic / Lymphatic: no cervical lymphadenopathy  Respiratory: No increased work of breathing, good air exchange, clear to auscultation bilaterally, no crackles or wheezing  Cardiovascular: Normal apical impulse, regular rate and rhythm, normal S1 and S2, no S3 or S4, and no murmur noted  GI:  normal bowel sounds, soft, non-distended,  non-tender, no masses palpated, no hepatosplenomegally  Genitounirinary: deferred  Skin: normal skin color, texture, turgor and no redness, warmth, or swelling  Musculoskeletal: There is no redness, warmth, or swelling of the joints.  Full range of motion noted.    Neurologic: Awake, alert, oriented to name, place and time.  Cranial nerves II-XII are grossly intact.   Neuropsychiatric: General: dismissive, calm, and poor eye contact    Data   Data reviewed today:   Recent Labs   Lab 12/18/24  1151   WBC 6.4   HGB 12.7   MCV 94         POTASSIUM 4.1   CHLORIDE 101   CO2 27   BUN 8.6   CR 0.71   ANIONGAP 7   STEPHANY 9.3   GLC 92   ALBUMIN 4.1   PROTTOTAL 7.4   BILITOTAL 0.2   ALKPHOS 75   ALT 35   AST 30       No results found for this or any previous visit (from the past 24 hours).

## 2024-12-20 LAB
HBV DNA SERPL QL NAA+PROBE: NORMAL
HCV RNA SERPL QL NAA+PROBE: NORMAL
HIV1+2 RNA SERPL QL NAA+PROBE: NORMAL
VIT D+METAB SERPL-MCNC: 16 NG/ML (ref 20–50)

## 2024-12-20 PROCEDURE — 250N000013 HC RX MED GY IP 250 OP 250 PS 637: Performed by: NURSE PRACTITIONER

## 2024-12-20 PROCEDURE — 250N000013 HC RX MED GY IP 250 OP 250 PS 637

## 2024-12-20 PROCEDURE — 124N000001 HC R&B MH

## 2024-12-20 PROCEDURE — 99232 SBSQ HOSP IP/OBS MODERATE 35: CPT

## 2024-12-20 RX ADMIN — HYDROXYZINE HYDROCHLORIDE 25 MG: 25 TABLET ORAL at 17:23

## 2024-12-20 RX ADMIN — MELATONIN 3 MG: at 22:04

## 2024-12-20 RX ADMIN — METRONIDAZOLE 500 MG: 500 TABLET ORAL at 20:13

## 2024-12-20 RX ADMIN — METRONIDAZOLE 500 MG: 500 TABLET ORAL at 08:07

## 2024-12-20 RX ADMIN — VALACYCLOVIR HYDROCHLORIDE 1000 MG: 500 TABLET, FILM COATED ORAL at 08:07

## 2024-12-20 RX ADMIN — OLANZAPINE 10 MG: 10 TABLET, FILM COATED ORAL at 22:04

## 2024-12-20 RX ADMIN — OLANZAPINE 10 MG: 10 TABLET, FILM COATED ORAL at 09:25

## 2024-12-20 RX ADMIN — Medication 1 TABLET: at 08:07

## 2024-12-20 RX ADMIN — NICOTINE POLACRILEX 2 MG: 2 GUM, CHEWING ORAL at 13:10

## 2024-12-20 ASSESSMENT — ACTIVITIES OF DAILY LIVING (ADL)
ADLS_ACUITY_SCORE: 16
HYGIENE/GROOMING: INDEPENDENT
ADLS_ACUITY_SCORE: 16
LAUNDRY: UNABLE TO COMPLETE
ADLS_ACUITY_SCORE: 16
ORAL_HYGIENE: INDEPENDENT
ADLS_ACUITY_SCORE: 16
DRESS: SCRUBS (BEHAVIORAL HEALTH);INDEPENDENT
ADLS_ACUITY_SCORE: 16

## 2024-12-20 NOTE — PLAN OF CARE
Face to face end of shift report will be communicated to oncoming RN.    Problem: Adult Behavioral Health Plan of Care  Goal: Patient-Specific Goal (Individualization)  Description: Pt will eat at least 50% of meals while on the unit.   Pt will sleep 6-8 hours per night.  Pt will attend at least 2 groups per day.   Pt will be medication compliant.   Pt will follow treatment team recommendations.     12/19/2024 Pt is in MHICU due to need for decreased stimuli. No wean at this time due to labile mood. Treatment team to assess daily.       Outcome: Progressing     Problem: Suicide Risk  Goal: Absence of Self-Harm  Description: Pt will be free from self harm while on the unit.   Pt will identify at least 2 coping skills for suicidal ideation.   Outcome: Progressing    Face to face end of shift report obtained from HECTOR Tellez. Pt observed resting in bed.   Pt appears to be sleeping in bed with eyes closed. 15 minutes and PRN safety checks completed with no noted complains. No self harm noted or reported so far this shift.   0600-Pt awake at times. Appeared to had slept 4.5-5 hours. Fluids provided.

## 2024-12-20 NOTE — DISCHARGE INSTRUCTIONS
Behavioral Discharge Planning and Instructions    Summary: 24 year old female with history of depression, mood disorder, ADHD, PTSD, and substance use disorder presents to the ER from clinic for evaluation suicide ideation and inpatient mental health placement.     Main Diagnosis: Suicidal ideation  #. Mood disorder, unspecified (bipolar disorder vs MDD)  #. PTSD by hx  #. Stimulant use disorder (cocaine, methamphetamine), moderate    Health Care Follow-up:     [facility]  Cara Zambrano- 1/18 @ 8am  [address] [place] [address]  [phone]     Attend all scheduled appointments with your outpatient providers. Call at least 24 hours in advance if you need to reschedule an appointment to ensure continued access to your outpatient providers.     Major Treatments, Procedures and Findings:  You were provided with: a psychiatric assessment, assessed for medical stability, medication evaluation and/or management, group therapy, individual therapy, CD evaluation/assessment, milieu management, and medical interventions    Symptoms to Report: feeling more aggressive, increased confusion, losing more sleep, mood getting worse, or thoughts of suicide    Early warning signs can include: increased depression or anxiety sleep disturbances increased thoughts or behaviors of suicide or self-harm  increased unusual thinking, such as paranoia or hearing voices    Safety and Wellness:  Take all medicines as directed.  Make no changes unless your doctor suggests them.      Follow treatment recommendations.  Refrain from alcohol and non-prescribed drugs.  Ask your support system to help you reduce your access to items that could harm yourself or others. Items could include:  Firearms  Medicines (both prescribed and over-the-counter)  Knives and other sharp objects  Ropes and like materials  Car keys  If there is a concern for safety, call 911. If there is a concern for safety, call 911.    Resources:  "  Crisis Intervention: 602.230.3618 or 872-083-9623 (TTY: 821.705.4354).  Call anytime for help.  National [place] on Mental Illness (www.[place].ti.org): 541.247.1802 or 605-477-1177.  [place] Association for Children's Mental Health (www.mac.org): 301.959.7049.  Alcoholics Anonymous (www.alcoholics-anonymous.org): Check your phone book for your local chapter.  Suicide Awareness Voices of Education (SAVE) (www.save.org): 500-206-BDIO (7817)  National Suicide Prevention Line (www.mentalhealthmn.org): 589-589-LSZN (1141)  Mental Health Consumer/Survivor Network of [place] (www.mhcsn.net): 828.750.3341 or 415-061-1590  Mental Health Association of [place] (www.mentalhealth.org): 972.394.7864 or 576-999-3759  Self- Management and Recovery Training., SMART-- Toll free: 812.421.7460  www.TabTale  Text 4 Life: txt \"LIFE\" to 99869 for immediate support and crisis intervention  Crisis text line: Text \"[place]\" to 362664. Free, confidential, 24/7.  Crisis Intervention: 975.872.8480 or 096-835-4636. Call anytime for help.     General Medication Instructions:   See your medication sheet(s) for instructions.   Take all medicines as directed.  Make no changes unless your doctor suggests them.   Go to all your doctor visits.  Be sure to have all your required lab tests. This way, your medicines can be refilled on time.  Do not use any drugs not prescribed by your doctor.  Avoid alcohol.    Advance Directives:   Scanned document on file with [facility]? No scanned doc  Is document scanned? Pt states no documents  Honoring Choices Your Rights Handout: Informed and given  Was more information offered? Pt declined    The Treatment team has appreciated the opportunity to work with you. If you have any questions or concerns about your recent admission, you can contact the unit which can receive your call 24 hours a day, 7 days a week. They will be able to get in touch with a Provider if needed. The unit number is [phone].  "

## 2024-12-20 NOTE — PLAN OF CARE
Face to face shift report received from nurse. Rounding completed, pt observed.    Problem: Adult Behavioral Health Plan of Care  Goal: Patient-Specific Goal (Individualization)  Description: Pt will eat at least 50% of meals while on the unit.   Pt will sleep 6-8 hours per night.  Pt will attend at least 2 groups per day.   Pt will be medication compliant.   Pt will follow treatment team recommendations.     12/20/2024 Pt is in MHICU due to need for decreased stimuli. Ween as appropriate at this time due to labile mood. Treatment team to assess daily.  Outcome: Not Progressing     Patient was irritable this morning Received zyprexa 10mg at 0925. Patient asking to go home throughout the shift. Patient free from self harm and injury throughout the shift. Patient attended groups after lunch no issues noted.    Problem: Suicide Risk  Goal: Absence of Self-Harm  Description: Pt will be free from self harm while on the unit.   Pt will identify at least 2 coping skills for suicidal ideation.   Outcome: Not Progressing     Face to face report will be communicated to oncoming RN.    Jerod Durand RN  12/20/2024
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
## 2024-12-20 NOTE — PLAN OF CARE
Problem: Adult Behavioral Health Plan of Care  Goal: Patient-Specific Goal (Individualization)  Description: Pt will eat at least 50% of meals while on the unit.   Pt will sleep 6-8 hours per night.  Pt will attend at least 2 groups per day.   Pt will be medication compliant.   Pt will follow treatment team recommendations.     12/19/2024 Pt is in MHICU due to need for decreased stimuli. Ween as appropriate at this time due to labile mood. Treatment team to assess daily.       Outcome: Progressing     Problem: Suicide Risk  Goal: Absence of Self-Harm  Description: Pt will be free from self harm while on the unit.   Pt will identify at least 2 coping skills for suicidal ideation.   Outcome: Progressing   Goal Outcome Evaluation:    Pt in bed at the start of the shift. Pt woken up for dinner, ate in her room. Pt asked to use the phone, pt allowed to wean to use the phones in the open unit. Pt attended some groups, socialized with peers and staff. Pt requested something for her anxiety and was given hydroxyzine 25mg at 1723.     Pt journaled and played yatzee in the lounge, talked on the phone to family/friends.             Plan of Care Reviewed With: patient          Face to face end of shift report communicated to night shift RN. Reported that pt is a risk for suicide.     Rosalinda Lagunas RN  12/20/2024  3:37 PM

## 2024-12-20 NOTE — PROGRESS NOTES
Appointment made for pt for follow-up.     [facility]  Cara Zambrano- 1/18 @ 8am  [address] [place] [address]  [phone]

## 2024-12-21 VITALS
HEART RATE: 104 BPM | TEMPERATURE: 98.8 F | WEIGHT: 224.5 LBS | BODY MASS INDEX: 35.16 KG/M2 | OXYGEN SATURATION: 99 % | RESPIRATION RATE: 18 BRPM | SYSTOLIC BLOOD PRESSURE: 139 MMHG | DIASTOLIC BLOOD PRESSURE: 84 MMHG

## 2024-12-21 PROCEDURE — 99232 SBSQ HOSP IP/OBS MODERATE 35: CPT | Mod: 95

## 2024-12-21 PROCEDURE — 250N000013 HC RX MED GY IP 250 OP 250 PS 637: Performed by: NURSE PRACTITIONER

## 2024-12-21 PROCEDURE — 250N000013 HC RX MED GY IP 250 OP 250 PS 637

## 2024-12-21 PROCEDURE — 124N000001 HC R&B MH

## 2024-12-21 RX ORDER — DOCOSANOL 100 MG/G
CREAM TOPICAL
Status: DISCONTINUED | OUTPATIENT
Start: 2024-12-21 | End: 2024-12-22 | Stop reason: HOSPADM

## 2024-12-21 RX ORDER — DOCOSANOL 100 MG/G
CREAM TOPICAL
COMMUNITY
Start: 2024-12-21 | End: 2024-12-31

## 2024-12-21 RX ORDER — VALACYCLOVIR HYDROCHLORIDE 1 G/1
1000 TABLET, FILM COATED ORAL DAILY
Qty: 7 TABLET | Refills: 0 | Status: SHIPPED | OUTPATIENT
Start: 2024-12-22

## 2024-12-21 RX ORDER — METRONIDAZOLE 500 MG/1
500 TABLET ORAL 2 TIMES DAILY
Qty: 7 TABLET | Refills: 0 | Status: SHIPPED | OUTPATIENT
Start: 2024-12-21

## 2024-12-21 RX ADMIN — DOCOSANOL: 100 CREAM TOPICAL at 19:21

## 2024-12-21 RX ADMIN — GABAPENTIN 300 MG: 300 CAPSULE ORAL at 19:22

## 2024-12-21 RX ADMIN — DOCOSANOL: 100 CREAM TOPICAL at 21:11

## 2024-12-21 RX ADMIN — MELATONIN 3 MG: at 21:09

## 2024-12-21 RX ADMIN — Medication 1 TABLET: at 08:15

## 2024-12-21 RX ADMIN — METRONIDAZOLE 500 MG: 500 TABLET ORAL at 20:10

## 2024-12-21 RX ADMIN — VALACYCLOVIR HYDROCHLORIDE 1000 MG: 500 TABLET, FILM COATED ORAL at 08:15

## 2024-12-21 RX ADMIN — METRONIDAZOLE 500 MG: 500 TABLET ORAL at 08:15

## 2024-12-21 ASSESSMENT — ACTIVITIES OF DAILY LIVING (ADL)
ADLS_ACUITY_SCORE: 16

## 2024-12-21 NOTE — PLAN OF CARE
Face to face end of shift report will be communicated to oncoming RN.    Problem: Adult Behavioral Health Plan of Care  Goal: Patient-Specific Goal (Individualization)  Description: Pt will eat at least 50% of meals while on the unit.   Pt will sleep 6-8 hours per night.  Pt will attend at least 2 groups per day.   Pt will be medication compliant.   Pt will follow treatment team recommendations.     12/19/2024 Pt is in MHICU due to need for decreased stimuli. Ween as appropriate at this time due to labile mood. Treatment team to assess daily.       Outcome: Progressing     Problem: Suicide Risk  Goal: Absence of Self-Harm  Description: Pt will be free from self harm while on the unit.   Pt will identify at least 2 coping skills for suicidal ideation.   Outcome: Progressing  Face to face end of shift report obtained from HECTOR Tellez. Pt observed resting in bed.   Pt appears to be sleeping in bed with eyes closed. 15 minutes and PRN safety checks completed with no noted complains. No self harm noted or reported so far this shift.     0600-Pt appeared to had slept all night.
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
## 2024-12-21 NOTE — PLAN OF CARE
Face to face report received from Lakesha YOUNG. Pt. Observed.    Problem: Adult Behavioral Health Plan of Care  Goal: Patient-Specific Goal (Individualization)  Description: Pt will eat at least 50% of meals while on the unit.   Pt will sleep 6-8 hours per night.  Pt will attend at least 2 groups per day.   Pt will be medication compliant.   Pt will follow treatment team recommendations.     12/21/2024 Pt is in MHICU due to need for decreased stimuli. Ween as appropriate at this time due to labile mood. Treatment team to assess daily.       12/21/2024 1509 by Shirley Ricci, RN  Outcome: Progressing   Goal Outcome Evaluation:    Pt. Denies all criteria. Pt. Requesting to discharge. Provider in agreement . Pt. Requesting to take [facility] in [place] to get her medications, and her boyfriend will pick her up from there. [employer] to  pt. @ 11 a..m. Pt. Cooperative with medications and nursing assessment. Pt. Weaning to open unit with no issues. Pt. Eating WDL. Pt. Offers no c/o issues with bowel and bladder.       Face to face end of shift report communicated to oncoming RN.     Shirley Ricci RN  12/21/2024  3:09 PM

## 2024-12-22 PROCEDURE — 99239 HOSP IP/OBS DSCHRG MGMT >30: CPT | Mod: 95

## 2024-12-22 PROCEDURE — 250N000013 HC RX MED GY IP 250 OP 250 PS 637

## 2024-12-22 PROCEDURE — 250N000013 HC RX MED GY IP 250 OP 250 PS 637: Performed by: NURSE PRACTITIONER

## 2024-12-22 RX ORDER — GABAPENTIN 300 MG/1
300 CAPSULE ORAL 3 TIMES DAILY PRN
Qty: 15 CAPSULE | Refills: 0 | Status: SHIPPED | OUTPATIENT
Start: 2024-12-22

## 2024-12-22 RX ADMIN — Medication 1 TABLET: at 08:20

## 2024-12-22 RX ADMIN — HYDROXYZINE HYDROCHLORIDE 25 MG: 25 TABLET ORAL at 10:42

## 2024-12-22 RX ADMIN — GABAPENTIN 300 MG: 300 CAPSULE ORAL at 08:26

## 2024-12-22 RX ADMIN — METRONIDAZOLE 500 MG: 500 TABLET ORAL at 08:20

## 2024-12-22 RX ADMIN — VALACYCLOVIR HYDROCHLORIDE 1000 MG: 500 TABLET, FILM COATED ORAL at 08:20

## 2024-12-22 ASSESSMENT — ACTIVITIES OF DAILY LIVING (ADL)
ADLS_ACUITY_SCORE: 16
HYGIENE/GROOMING: INDEPENDENT
DRESS: SCRUBS (BEHAVIORAL HEALTH);INDEPENDENT
ADLS_ACUITY_SCORE: 16
ADLS_ACUITY_SCORE: 16
LAUNDRY: UNABLE TO COMPLETE
ADLS_ACUITY_SCORE: 16
ORAL_HYGIENE: INDEPENDENT

## 2024-12-22 NOTE — PLAN OF CARE
"Pt reports she feels ready to discharge. She denies SI/HI and hallucinations.   Weaning appropriately throughout shift. Full range affect.   Denies pain.     Requested gabapentin PRN this shift for anxiety.     Pt reports she would like me to place a request that provider send a refill of her gabapentin to her pharmacy tomorrow.     She also reports she would \"like some ativan, because that is what really works the best for her\" I reported I would pass it along but that its highly unlikely she would be given ativan after discharge while not currently on it here. She requested I still send the request to provider.     PT requested and received  PRN melatonin for sleep.     Care to continue into NOC shift.       Sleeping throughout shift. Regular respirations and position changes.     Face to face end of shift report communicated to oncoming RN     Mel Jones RN  12/22/2024  5:17 AM                       Problem: Adult Behavioral Health Plan of Care  Goal: Patient-Specific Goal (Individualization)  Description: Pt will eat at least 50% of meals while on the unit.   Pt will sleep 6-8 hours per night.  Pt will attend at least 2 groups per day.   Pt will be medication compliant.   Pt will follow treatment team recommendations.     12/19/2024 Pt is in MHICU due to need for decreased stimuli. Ween as appropriate at this time due to labile mood. Treatment team to assess daily.       Outcome: Progressing     Problem: Suicide Risk  Goal: Absence of Self-Harm  Description: Pt will be free from self harm while on the unit.   Pt will identify at least 2 coping skills for suicidal ideation.   Outcome: Progressing   Goal Outcome Evaluation:                        "
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
## 2024-12-22 NOTE — PROGRESS NOTES
Problem: Adult Behavioral Health Plan of Care  Goal: Plan of Care Review  Outcome: Adequate for Care Transition  Goal: Patient-Specific Goal (Individualization)  Description: Pt will eat at least 50% of meals while on the unit.   Pt will sleep 6-8 hours per night.  Pt will attend at least 2 groups per day.   Pt will be medication compliant.   Pt will follow treatment team recommendations.     12/19/2024 Pt is in MHICU due to need for decreased stimuli. Ween as appropriate at this time due to labile mood. Treatment team to assess daily.       Outcome: Adequate for Care Transition  Goal: Adheres to Safety Considerations for Self and Others  Outcome: Adequate for Care Transition  Goal: Absence of New-Onset Illness or Injury  Outcome: Adequate for Care Transition  Goal: Optimized Coping Skills in Response to Life Stressors  Outcome: Adequate for Care Transition  Goal: Develops/Participates in Therapeutic [place] to Support Successful Transition  Outcome: Adequate for Care Transition     Problem: Suicide Risk  Goal: Absence of Self-Harm  Description: Pt will be free from self harm while on the unit.   Pt will identify at least 2 coping skills for suicidal ideation.   Outcome: Adequate for Care Transition   Goal Outcome Evaluation:  Face to face end of shift report received from RN. Patient rounding completed, laying in bed.   December 22, 2024 6:49 AM    Patient denies pain. Patient reports anxiety, prn gabapentin given. Patient is looking forward to discharge. Patient currently weaning, eating breakfast. Patient is pleasant and cooperative, med compliant. Patient denies AH/VH, SI/HI, or any harmful thoughts towards self or others at this time, agrees to notify staff if anything changes. Patient does not report any concerns or complaints at this time.   December 22, 2024 8:39 AM    Discharge Note    Patient Discharged to home on 12/22/2024 10:58 AM via [employer] accompanied by  and  Staff walked patient out.      Patient informed of discharge instructions in AVS. patient verbalizes understanding and denies having any questions pertaining to AVS. Patient stable at time of discharge. Patient denies SI, HI, and thoughts of self harm at time of discharge. All personal belongings returned to patient. Discharge prescriptions sent to [facility] [place], [place] via electronic communication.

## 2024-12-30 ENCOUNTER — TELEPHONE (OUTPATIENT)
Dept: PSYCHIATRY | Facility: OTHER | Age: 24
End: 2024-12-30
Payer: COMMERCIAL

## 2024-12-30 DIAGNOSIS — F41.1 GAD (GENERALIZED ANXIETY DISORDER): ICD-10-CM

## 2024-12-30 RX ORDER — GABAPENTIN 300 MG/1
300 CAPSULE ORAL 3 TIMES DAILY PRN
Qty: 15 CAPSULE | Refills: 0 | OUTPATIENT
Start: 2024-12-30

## 2024-12-30 NOTE — TELEPHONE ENCOUNTER
[id number] sent Rx request for the following:      Requested Prescriptions   Pending Prescriptions Disp Refills    gabapentin (NEURONTIN) 300 MG capsule 15 capsule 0     Sig: Take 1 capsule (300 mg) by mouth 3 times daily as needed (cravings, anxiety).       There is no refill protocol information for this order          Last Prescription Date:   12/22/24  Last Fill Qty/Refills:         15, R-0    Last Office Visit:              12/18/24   Future Office visit:           1/16/25    Routing refill request to provider for review/approval because:  Drug not on the [facility] refill protocol     Irene Mauricio RN on 12/30/2024 at 2:51 PM

## 2024-12-30 NOTE — TELEPHONE ENCOUNTER
Patient requested this be sent to [facility] for consideration....    Reason for call: Medication or medication refill    Have you contacted your pharmacy regarding this refill? Yes    If No, direct the patient to contact the Pharmacy and discontinue telephone note using Erroneous Encounter.  If Yes, continue.    Name of medication requested: gabapentin (NEURONTIN) 300 MG capsule     How many days of medication do you have left? 0    What pharmacy do you use? [facility]    Preferred method for responding to this message: Telephone Call    Phone number patient can be reached at: Cell number on file:    Telephone Information:   Mobile [phone]       If we cannot reach you directly, may we leave a detailed response at the number you provided? Yes    Selma Saba on 12/30/2024 at 2:41 PM

## 2025-01-07 ENCOUNTER — TELEPHONE (OUTPATIENT)
Dept: PSYCHIATRY | Facility: OTHER | Age: 25
End: 2025-01-07
Payer: COMMERCIAL

## 2025-01-07 NOTE — TELEPHONE ENCOUNTER
Patient states that she sent a request in for hergabapentin (NEURONTIN) 300 MG capsule and an anxiety med to be filled.  She has not heard anything back.  States she has court coming up and is very anxious,   Please call patient back   Thank you   Latonia Jacome on 1/7/2025 at 11:24 AM

## 2025-01-07 NOTE — TELEPHONE ENCOUNTER
Spoke with CORKY Pederson. She states she has not prescribed gabapentin to patient in the past and she currently has no anxiety medication on her med list. Patient can discuss these concerns with her at her appointment next week 1/16/25.     Called and spoke to Patient after verifying last name and date of birth.  Informed her that she needs to be seen in person prior to getting these. Patient states she will keep her appointment for 1/16/25. Patient verbalized understanding and has no further questions at this time.     Geovanna Celis RN on 1/7/2025 at 11:39 AM

## 2025-01-16 ENCOUNTER — OFFICE VISIT (OUTPATIENT)
Dept: PSYCHIATRY | Facility: OTHER | Age: 25
End: 2025-01-16
Payer: COMMERCIAL

## 2025-01-16 VITALS
HEART RATE: 93 BPM | SYSTOLIC BLOOD PRESSURE: 111 MMHG | RESPIRATION RATE: 16 BRPM | WEIGHT: 239.9 LBS | DIASTOLIC BLOOD PRESSURE: 75 MMHG | HEIGHT: 68 IN | TEMPERATURE: 98.2 F | OXYGEN SATURATION: 97 % | BODY MASS INDEX: 36.36 KG/M2

## 2025-01-16 DIAGNOSIS — F43.10 PTSD (POST-TRAUMATIC STRESS DISORDER): Primary | ICD-10-CM

## 2025-01-16 DIAGNOSIS — F41.1 GAD (GENERALIZED ANXIETY DISORDER): ICD-10-CM

## 2025-01-16 DIAGNOSIS — F15.90 STIMULANT USE DISORDER: ICD-10-CM

## 2025-01-16 DIAGNOSIS — F39 MOOD DISORDER: ICD-10-CM

## 2025-01-16 PROCEDURE — G0463 HOSPITAL OUTPT CLINIC VISIT: HCPCS

## 2025-01-16 RX ORDER — PROPRANOLOL HYDROCHLORIDE 10 MG/1
10 TABLET ORAL 3 TIMES DAILY
Qty: 15 TABLET | Refills: 0 | Status: SHIPPED | OUTPATIENT
Start: 2025-01-16

## 2025-01-16 RX ORDER — LURASIDONE HYDROCHLORIDE 20 MG/1
TABLET, FILM COATED ORAL
Qty: 30 TABLET | Refills: 0 | Status: SHIPPED | OUTPATIENT
Start: 2025-01-16

## 2025-01-16 ASSESSMENT — ANXIETY QUESTIONNAIRES
1. FEELING NERVOUS, ANXIOUS, OR ON EDGE: NEARLY EVERY DAY
5. BEING SO RESTLESS THAT IT IS HARD TO SIT STILL: NEARLY EVERY DAY
7. FEELING AFRAID AS IF SOMETHING AWFUL MIGHT HAPPEN: NEARLY EVERY DAY
2. NOT BEING ABLE TO STOP OR CONTROL WORRYING: NEARLY EVERY DAY
GAD7 TOTAL SCORE: 21
IF YOU CHECKED OFF ANY PROBLEMS ON THIS QUESTIONNAIRE, HOW DIFFICULT HAVE THESE PROBLEMS MADE IT FOR YOU TO DO YOUR WORK, TAKE CARE OF THINGS AT HOME, OR GET ALONG WITH OTHER PEOPLE: SOMEWHAT DIFFICULT
GAD7 TOTAL SCORE: 21
3. WORRYING TOO MUCH ABOUT DIFFERENT THINGS: NEARLY EVERY DAY
8. IF YOU CHECKED OFF ANY PROBLEMS, HOW DIFFICULT HAVE THESE MADE IT FOR YOU TO DO YOUR WORK, TAKE CARE OF THINGS AT HOME, OR GET ALONG WITH OTHER PEOPLE?: SOMEWHAT DIFFICULT
GAD7 TOTAL SCORE: 21
6. BECOMING EASILY ANNOYED OR IRRITABLE: NEARLY EVERY DAY
7. FEELING AFRAID AS IF SOMETHING AWFUL MIGHT HAPPEN: NEARLY EVERY DAY
4. TROUBLE RELAXING: NEARLY EVERY DAY

## 2025-01-16 ASSESSMENT — PATIENT HEALTH QUESTIONNAIRE - PHQ9
10. IF YOU CHECKED OFF ANY PROBLEMS, HOW DIFFICULT HAVE THESE PROBLEMS MADE IT FOR YOU TO DO YOUR WORK, TAKE CARE OF THINGS AT HOME, OR GET ALONG WITH OTHER PEOPLE: SOMEWHAT DIFFICULT
SUM OF ALL RESPONSES TO PHQ QUESTIONS 1-9: 17
SUM OF ALL RESPONSES TO PHQ QUESTIONS 1-9: 17

## 2025-01-16 ASSESSMENT — PAIN SCALES - GENERAL: PAINLEVEL_OUTOF10: NO PAIN (0)

## 2025-01-16 NOTE — PROGRESS NOTES
[facility] PSYCHIATRY   PROGRESS NOTE     ASSESSMENT & PLAN     This is a 24 year old female who presents for ongoing psychiatric care and medication management for the following:        Diagnosis Comments   1. PTSD (post-traumatic stress disorder)  lurasidone (LATUDA) 20 MG TABS tablet, propranolol (INDERAL) 10 MG tablet       2. Mood disorder  lurasidone (LATUDA) 20 MG TABS tablet       3. TY (generalized anxiety disorder)  propranolol (INDERAL) 10 MG tablet       4. Stimulant use disorder           Discussed at length concerns for bipolar disorder, recent activation from stimulant use (methamphetamine resulting in manic episode followed by severe depressive episode with suicide attempt), therefore will not be prescribing ADHD medication treatment prior to addressing mood/anxiety symptoms. Given mood lability with depression being most concerning symptom at present, opted for re-trial of Latuda. Given trauma history and PTSD symptoms with physical manifestations of anxiety, discussed propranolol for PRN use. Risks/benefits/side effects discussed at length. Suzy verbalized understanding of the above treatment plan and consented.     Medication:   Start Latuda - 10 mg for 1 week, then increase to 20 mg for mood.  Start propranolol - take 10 mg up to three times daily as needed for anxiety/panic - limited quantity given.    Goals: mood stabilization    Therapy: encouraged.     Labs: none ordered.     F/U: 1 month.    The risks, benefits, alternatives and side effects have been discussed and are understood by the patient. The patient understands the risks of using street drugs or alcohol. The patient understands to call 911, 211 ([facility] Crisis Line) or come to the nearest ED if life threatening or urgent symptoms present.       HISTORY OF PRESENT ILLNESS     Suzy Miles was last seen in clinic on 12/18/2024.  At that time:    Given recent suicide attempt via overdose and concern  "for severe depressive episode following manic episode (most likely drug induced via meth use, however cannot fully r/o underlying bipolar disorder), patient escorted to ED for admittance for inpatient hospitalization for further stabilization. Continues to endorse active suicidal ideation (see HPI). Has been medication non-compliant in the past with history of incarceration and substance use further complicating mental health. Biggest protective factor for patient is her daughter, however comments today include \"she'd be fine and better if I was dead, then she could be with my aunts or my mom and grandma\". Reasons for escalating care were discussed at length with patient - at this time, patient voluntary for placement/further stabilization however does have propensity to escalate.     Today:    Suzy Miles presents for ongoing medication management and psychiatric care. Since being released from inpatient stay at [facility], states she continues to deal with severe anxiety, ongoing depression and feeling \"overstimulated\". Reviewed documentation from  which stated Suzy was only willing to consider Adderall for medication as ADHD treatment. She states she has been \"clean\" from substances since discharge, however then does admit to occasional THC and ETOH use. She is worried about upcoming court in which she anticipates being placed on probation. She currently denies suicidal ideation, denies thoughts of self harm.     Sleep: variable.    Bio/psycho/social concerns: employment or unemployment issues, financial insecurity, Incarceration/legal issues, transportation difficulty, and substance use    Past Med Trials:  Adderall   Vyvanse  Latuda (unsure if started)  Sertraline (never started)  Gabapentin (prescribed by ED provider for ETOH cravings/anxiety on 11/2024)       REVIEW OF SYSTEMS   The review of systems is negative other than noted in the HPI.    Past Medical History:   Diagnosis Date    Anxiety  " "   Depression     Ovarian cyst        No Known Allergies        MENTAL STATUS EXAM      Vitals: /75 (BP Location: Left arm, Patient Position: Sitting, Cuff Size: Adult Large)   Pulse 93   Temp 98.2  F (36.8  C) (Tympanic)   Resp 16   Ht 1.715 m (5' 7.5\")   Wt 108.8 kg (239 lb 14.4 oz)   LMP 12/16/2024 (Exact Date)   SpO2 97%   BMI 37.02 kg/m      Wt Readings from Last 4 Encounters:   01/16/25 108.8 kg (239 lb 14.4 oz)   12/21/24 101.8 kg (224 lb 8 oz)   12/18/24 103.3 kg (227 lb 12.8 oz)   11/10/24 101.2 kg (223 lb)      Appearance:  No apparent distress, Casually dressed  Behavior/relationship to examiner/demeanor:  Cooperative and Engaged  Motor activity/EPS:  Normal  Gait:  Normal  Speech rate:  Normal  Speech volume:  Soft  Speech articulation:  Normal  Mood (subjective report):  depressed, anxious  Affect (objective appearance):  mood congruent  Thought Process (Associations):  Logical  Thought process (Rate):  Normal  Thought content:  no overt psychosis, patient does not appear to be responding to internal stimuli, denies suicidal ideation  Attention/Concentration:  Fair  Insight:  Fair  Judgment:  Fair        8/27/2024    11:30 AM 12/18/2024    10:13 AM 1/16/2025     8:10 AM   PHQ   PHQ-9 Total Score 0 18  17    Q9: Thoughts of better off dead/self-harm past 2 weeks Not at all Several days Several days   F/U: Thoughts of suicide or self-harm  Yes No   F/U: Self harm-plan  Yes    F/U: Self-harm action  Yes    F/U: Safety concerns  Yes No       Patient-reported         4/3/2024     8:28 AM 12/18/2024    10:14 AM 1/16/2025     8:10 AM   TY-7 SCORE   Total Score 21 (severe anxiety) 20 (severe anxiety) 21 (severe anxiety)   Total Score 21 20  21        Patient-reported       Suicide Risk Assessment:    Today Suzy Miles reports anxiety and depression. In addition, she has notable risk factors for self-harm, including age, single status, anxiety, substance abuse, and previous suicide attempts. " However, risk is mitigated by commitment to family and history of seeking help when needed. Therefore, based on all available evidence including the factors cited above, she does not appear to be at imminent risk for self-harm, does not meet criteria for a 72-hr hold, and therefore remains appropriate for ongoing outpatient level of care.        ATTESTATION      Melonie Zambrano, BERTIN, PMHNP-BC    34 minutes spent on the date of the encounter doing chart review, history and exam, documentation and further activities per the note.    The longitudinal plan of care for the diagnosis(es)/condition(s) as documented were addressed during this visit. Due to the added complexity in care, I will continue to support Suzy in the subsequent management and with ongoing continuity of care.

## 2025-01-16 NOTE — PATIENT INSTRUCTIONS
Start latuda - take 10 mg (1/2 tablet) for 1 week, then increase to 20 mg (full tablet). Take this medication with food. Please try to take this medication consistently for 1 month. Side effects usually occur within the first few days and then go away.    Start propranolol 10 mg up to three times daily for anxiety/panic.

## 2025-01-16 NOTE — NURSING NOTE
"Chief Complaint   Patient presents with    Medication Follow-up       Initial LMP 12/16/2024 (Exact Date)  Estimated body mass index is 35.16 kg/m  as calculated from the following:    Height as of 11/10/24: 1.702 m (5' 7\").    Weight as of 12/21/24: 101.8 kg (224 lb 8 oz).  Medication Review: complete    The next two questions are to help us understand your food security.  If you are feeling you need any assistance in this area, we have resources available to support you today.          12/19/2024   SDOH- Food Insecurity   Within the past 12 months, did you worry that your food would run out before you got money to buy more? N    Pt Declined   Within the past 12 months, did the food you bought just not last and you didn t have money to get more? N    Pt Declined       Multiple values from one day are sorted in reverse-chronological order         Health Care Directive:  Patient does not have a Health Care Directive: Discussed advance care planning with patient; however, patient declined at this time.    Nadege Tran CNA      "

## (undated) DEVICE — DRSG MEDIPORE 3 1/2X8" 3570

## (undated) DEVICE — ESU GROUND PAD ADULT W/CORD E7507

## (undated) DEVICE — Device

## (undated) DEVICE — SLEEVE COMPRESSION SCD KNEE MED 74022

## (undated) DEVICE — SU VICRYL 4-0 KS 27" UND J662H

## (undated) DEVICE — COVER LIGHT HANDLE LT-F02

## (undated) DEVICE — SOL WATER 1500ML

## (undated) DEVICE — PREP CHLORAPREP 26ML TINTED ORANGE  260815

## (undated) DEVICE — GLOVE PROTEXIS POWDER FREE SMT 6.5  2D72PT65X

## (undated) DEVICE — DRSG STERI STRIP 1/2X4" R1547

## (undated) DEVICE — SU MONOCRYL 0 CT-1 36" UND Y946H

## (undated) DEVICE — GLOVE PROTEXIS BLUE W/NEU-THERA 6.5  2D73EB65

## (undated) DEVICE — SU VICRYL 0 CT-1 36" J346H

## (undated) DEVICE — PACK C SECTION SMA15CSFCA

## (undated) DEVICE — PAD ABD 5X9" STERILE 9190A

## (undated) DEVICE — PAD CHUX UNDERPAD 30X36" P3036C

## (undated) DEVICE — SUTURE PLAIN GUT 3-0 CT-1 842H

## (undated) RX ORDER — LIDOCAINE HYDROCHLORIDE 10 MG/ML
INJECTION, SOLUTION EPIDURAL; INFILTRATION; INTRACAUDAL; PERINEURAL
Status: DISPENSED
Start: 2022-09-03

## (undated) RX ORDER — AMOXICILLIN AND CLAVULANATE POTASSIUM 500; 125 MG/1; MG/1
TABLET, FILM COATED ORAL
Status: DISPENSED
Start: 2023-06-27

## (undated) RX ORDER — ACETAMINOPHEN 500 MG
TABLET ORAL
Status: DISPENSED
Start: 2021-09-17

## (undated) RX ORDER — ONDANSETRON 2 MG/ML
INJECTION INTRAMUSCULAR; INTRAVENOUS
Status: DISPENSED
Start: 2020-07-30

## (undated) RX ORDER — CEFTRIAXONE SODIUM 1 G
VIAL (EA) INJECTION
Status: DISPENSED
Start: 2022-09-03

## (undated) RX ORDER — PHENYLEPHRINE HYDROCHLORIDE 10 MG/ML
INJECTION INTRAVENOUS
Status: DISPENSED
Start: 2020-07-30

## (undated) RX ORDER — LORAZEPAM 1 MG/1
TABLET ORAL
Status: DISPENSED
Start: 2024-12-18

## (undated) RX ORDER — GABAPENTIN 300 MG/1
CAPSULE ORAL
Status: DISPENSED
Start: 2024-12-18

## (undated) RX ORDER — METRONIDAZOLE 500 MG/1
TABLET ORAL
Status: DISPENSED
Start: 2024-12-18

## (undated) RX ORDER — AMOXICILLIN 500 MG/1
CAPSULE ORAL
Status: DISPENSED
Start: 2023-06-27

## (undated) RX ORDER — SODIUM CHLORIDE, SODIUM LACTATE, POTASSIUM CHLORIDE, CALCIUM CHLORIDE 600; 310; 30; 20 MG/100ML; MG/100ML; MG/100ML; MG/100ML
INJECTION, SOLUTION INTRAVENOUS
Status: DISPENSED
Start: 2020-07-30

## (undated) RX ORDER — IBUPROFEN 200 MG
TABLET ORAL
Status: DISPENSED
Start: 2024-11-10

## (undated) RX ORDER — SULFAMETHOXAZOLE/TRIMETHOPRIM 800-160 MG
TABLET ORAL
Status: DISPENSED
Start: 2021-09-17

## (undated) RX ORDER — IBUPROFEN 400 MG/1
TABLET, FILM COATED ORAL
Status: DISPENSED
Start: 2021-09-17

## (undated) RX ORDER — SODIUM CHLORIDE 9 MG/ML
INJECTION, SOLUTION INTRAVENOUS
Status: DISPENSED
Start: 2020-07-30

## (undated) RX ORDER — AZITHROMYCIN 500 MG/5ML
INJECTION, POWDER, LYOPHILIZED, FOR SOLUTION INTRAVENOUS
Status: DISPENSED
Start: 2020-07-30

## (undated) RX ORDER — OXYTOCIN 10 [USP'U]/ML
INJECTION, SOLUTION INTRAMUSCULAR; INTRAVENOUS
Status: DISPENSED
Start: 2020-07-30

## (undated) RX ORDER — CEFAZOLIN SODIUM 2 G/100ML
INJECTION, SOLUTION INTRAVENOUS
Status: DISPENSED
Start: 2020-07-30

## (undated) RX ORDER — METHYLERGONOVINE MALEATE 0.2 MG/ML
INJECTION INTRAVENOUS
Status: DISPENSED
Start: 2020-07-30

## (undated) RX ORDER — MORPHINE SULFATE 0.5 MG/ML
INJECTION, SOLUTION EPIDURAL; INTRATHECAL; INTRAVENOUS
Status: DISPENSED
Start: 2020-07-30

## (undated) RX ORDER — OXYCODONE HYDROCHLORIDE 5 MG/1
TABLET ORAL
Status: DISPENSED
Start: 2024-11-10

## (undated) RX ORDER — PROPOFOL 10 MG/ML
INJECTION, EMULSION INTRAVENOUS
Status: DISPENSED
Start: 2020-07-30

## (undated) RX ORDER — MORPHINE SULFATE 10 MG/ML
INJECTION, SOLUTION INTRAMUSCULAR; INTRAVENOUS
Status: DISPENSED
Start: 2020-07-28